# Patient Record
Sex: FEMALE | Race: BLACK OR AFRICAN AMERICAN | Employment: OTHER | URBAN - METROPOLITAN AREA
[De-identification: names, ages, dates, MRNs, and addresses within clinical notes are randomized per-mention and may not be internally consistent; named-entity substitution may affect disease eponyms.]

---

## 2018-02-11 ENCOUNTER — HOSPITAL ENCOUNTER (EMERGENCY)
Age: 38
Discharge: HOME | End: 2018-02-11
Attending: EMERGENCY MEDICINE
Payer: MEDICARE

## 2018-02-11 VITALS
BODY MASS INDEX: 35.85 KG/M2 | RESPIRATION RATE: 20 BRPM | HEIGHT: 64 IN | OXYGEN SATURATION: 98 % | WEIGHT: 210 LBS | TEMPERATURE: 98.7 F | SYSTOLIC BLOOD PRESSURE: 122 MMHG | DIASTOLIC BLOOD PRESSURE: 69 MMHG | HEART RATE: 99 BPM

## 2018-02-11 DIAGNOSIS — J06.9 ACUTE UPPER RESPIRATORY INFECTION: Primary | ICD-10-CM

## 2018-02-11 DIAGNOSIS — I10 ESSENTIAL HYPERTENSION: ICD-10-CM

## 2018-02-11 DIAGNOSIS — N30.00 ACUTE CYSTITIS WITHOUT HEMATURIA: ICD-10-CM

## 2018-02-11 LAB
ALBUMIN SERPL-MCNC: 3.5 G/DL (ref 3.5–5)
ALBUMIN/GLOB SERPL: 1 {RATIO} (ref 1.2–3.5)
ALP SERPL-CCNC: 67 U/L (ref 50–136)
ALT SERPL-CCNC: 26 U/L (ref 12–65)
ANION GAP SERPL CALC-SCNC: 4 MMOL/L (ref 7–16)
AST SERPL-CCNC: 14 U/L (ref 15–37)
BACTERIA URNS QL MICRO: ABNORMAL /HPF
BASOPHILS # BLD: 0 K/UL (ref 0–0.2)
BASOPHILS NFR BLD: 0 % (ref 0–2)
BILIRUB SERPL-MCNC: 0.2 MG/DL (ref 0.2–1.1)
BUN SERPL-MCNC: 14 MG/DL (ref 6–23)
CALCIUM SERPL-MCNC: 8.9 MG/DL (ref 8.3–10.4)
CASTS URNS QL MICRO: ABNORMAL /LPF
CHLORIDE SERPL-SCNC: 106 MMOL/L (ref 98–107)
CO2 SERPL-SCNC: 31 MMOL/L (ref 21–32)
CREAT SERPL-MCNC: 0.69 MG/DL (ref 0.6–1)
DIFFERENTIAL METHOD BLD: ABNORMAL
EOSINOPHIL # BLD: 0.1 K/UL (ref 0–0.8)
EOSINOPHIL NFR BLD: 1 % (ref 0.5–7.8)
EPI CELLS #/AREA URNS HPF: ABNORMAL /HPF
ERYTHROCYTE [DISTWIDTH] IN BLOOD BY AUTOMATED COUNT: 14 % (ref 11.9–14.6)
GLOBULIN SER CALC-MCNC: 3.4 G/DL (ref 2.3–3.5)
GLUCOSE SERPL-MCNC: 98 MG/DL (ref 65–100)
HCG UR QL: NEGATIVE
HCT VFR BLD AUTO: 39.3 % (ref 35.8–46.3)
HGB BLD-MCNC: 13 G/DL (ref 11.7–15.4)
IMM GRANULOCYTES # BLD: 0 K/UL (ref 0–0.5)
IMM GRANULOCYTES NFR BLD AUTO: 0 % (ref 0–5)
LIPASE SERPL-CCNC: 162 U/L (ref 73–393)
LYMPHOCYTES # BLD: 4.3 K/UL (ref 0.5–4.6)
LYMPHOCYTES NFR BLD: 41 % (ref 13–44)
MCH RBC QN AUTO: 28.6 PG (ref 26.1–32.9)
MCHC RBC AUTO-ENTMCNC: 33.1 G/DL (ref 31.4–35)
MCV RBC AUTO: 86.6 FL (ref 79.6–97.8)
MONOCYTES # BLD: 0.5 K/UL (ref 0.1–1.3)
MONOCYTES NFR BLD: 5 % (ref 4–12)
NEUTS SEG # BLD: 5.5 K/UL (ref 1.7–8.2)
NEUTS SEG NFR BLD: 53 % (ref 43–78)
PLATELET # BLD AUTO: 288 K/UL (ref 150–450)
PMV BLD AUTO: 9.7 FL (ref 10.8–14.1)
POTASSIUM SERPL-SCNC: 3.9 MMOL/L (ref 3.5–5.1)
PROT SERPL-MCNC: 6.9 G/DL (ref 6.3–8.2)
RBC # BLD AUTO: 4.54 M/UL (ref 4.05–5.25)
RBC #/AREA URNS HPF: ABNORMAL /HPF
SODIUM SERPL-SCNC: 141 MMOL/L (ref 136–145)
WBC # BLD AUTO: 10.4 K/UL (ref 4.3–11.1)
WBC URNS QL MICRO: >100 /HPF

## 2018-02-11 PROCEDURE — 81003 URINALYSIS AUTO W/O SCOPE: CPT | Performed by: EMERGENCY MEDICINE

## 2018-02-11 PROCEDURE — 81015 MICROSCOPIC EXAM OF URINE: CPT | Performed by: EMERGENCY MEDICINE

## 2018-02-11 PROCEDURE — 81025 URINE PREGNANCY TEST: CPT

## 2018-02-11 PROCEDURE — 99284 EMERGENCY DEPT VISIT MOD MDM: CPT | Performed by: EMERGENCY MEDICINE

## 2018-02-11 PROCEDURE — 85025 COMPLETE CBC W/AUTO DIFF WBC: CPT | Performed by: EMERGENCY MEDICINE

## 2018-02-11 PROCEDURE — 80053 COMPREHEN METABOLIC PANEL: CPT | Performed by: EMERGENCY MEDICINE

## 2018-02-11 PROCEDURE — 83690 ASSAY OF LIPASE: CPT | Performed by: EMERGENCY MEDICINE

## 2018-02-11 RX ORDER — CEPHALEXIN 500 MG/1
500 CAPSULE ORAL 3 TIMES DAILY
Qty: 21 CAP | Refills: 0 | Status: SHIPPED | OUTPATIENT
Start: 2018-02-11 | End: 2018-02-18

## 2018-02-12 NOTE — ED NOTES
I have reviewed discharge instructions with the patient. The patient verbalized understanding. Patient left ED via Discharge Method: ambulatory to Home with family member. Opportunity for questions and clarification provided. Patient given 2 scripts. To continue your aftercare when you leave the hospital, you may receive an automated call from our care team to check in on how you are doing. This is a free service and part of our promise to provide the best care and service to meet your aftercare needs.  If you have questions, or wish to unsubscribe from this service please call [phone]. Thank you for Choosing our [facility] Emergency Department.

## 2018-02-12 NOTE — ED TRIAGE NOTES
Pt states \"my period went off 5 days ago and I am having extreme menstrual cramps. \"  Pt states \"I also think I have bronchitis. \" Pt denies n/v. Pt states she has urinary frequency and states she is incontinent.

## 2018-02-12 NOTE — DISCHARGE INSTRUCTIONS
Upper Respiratory Infection: After Your Visit to the Emergency Room  Your Care Instructions  You were seen in the emergency room for an upper respiratory infection (URI). This is an infection of the nose, sinuses, or throat. Viruses or bacteria can cause URIs. Colds, flu, and sinusitis are examples of URIs. These infections are spread by coughs, sneezes, and close contact with people who have a URI. Your doctor may have given you antibiotics to treat the infection if it was caused by bacteria. But antibiotics will not help a viral infection. You can treat most infections with home care. This may include drinking lots of fluids and taking over-the-counter medicine for your symptoms. Even though you have been released from the emergency room, you still need to watch for any problems. The doctor carefully checked you. But sometimes problems can develop later. If you have new symptoms, or if your symptoms do not get better, return to the emergency room or call your doctor right away. A visit to the emergency room is only one step in your treatment. Even if you feel better, you still need to do what your doctor recommends, such as going to all suggested follow-up appointments and taking medicines exactly as directed. This will help you recover and help prevent future problems. How can you care for yourself at home? · To prevent dehydration, drink plenty of fluids, enough so that your urine is light yellow or clear like water. Choose water and other caffeine-free clear liquids until you feel better. If you have kidney, heart, or liver disease and have to limit fluids, talk with your doctor before you increase the amount of fluids you drink. · Take acetaminophen (Tylenol) or ibuprofen (Advil, Motrin) for fever or pain. Read and follow all instructions on the label. · If your doctor prescribed antibiotics, take them as directed. Do not stop taking them just because you feel better.  You need to take the full course of antibiotics. · Take cough medicine and a decongestant if your doctor suggests it. · Get plenty of rest.  · Use saline (saltwater) nasal washes to help keep your nasal passages open and wash out mucus and bacteria. You can buy saline nose drops at a grocery store or drugstore. Or you can make your own at home by adding 1 teaspoon of salt and 1 teaspoon of baking soda to 2 cups of distilled water. If you make your own, fill a bulb syringe with the solution, insert the tip into your nostril, and squeeze gently. [place]  your nose. · Use a vaporizer or humidifier to add moisture to the air in your bedroom. Follow the instructions for cleaning it. · Do not smoke or allow others to smoke around you. If you need help quitting, talk to your doctor about stop-smoking programs and medicines. These can increase your chances of quitting for good. When should you call for help? Call 911 if:  · You have severe trouble breathing. Return to the emergency room now if:  · You have a fever with stiff neck or a severe headache. · You have signs of needing more fluids. You have sunken eyes, a dry mouth, and pass only a little dark urine. · You cannot keep down fluids or medicine. Call your doctor today if:  · You have a deep cough and a lot of mucus. · You are too tired to eat or drink. · You have a new symptom, such as a sore throat, an earache, or a rash. Where can you learn more? Go to [employer]  Enter S405 in the search box to learn more about \"Upper Respiratory Infection: After Your Visit to the Emergency Room. \"   © 8088-7130 Healthwise, Incorporated. Care instructions adapted under license by [facility] (which disclaims liability or warranty for this information).  This care instruction is for use with your licensed healthcare professional. If you have questions about a medical condition or this instruction, always ask your healthcare professional. [address]  any warranty or liability for your use of this information.   Content Version: 9.3.07416; Last Revised: February 13, 2012

## 2018-02-12 NOTE — ED PROVIDER NOTES
HPI Comments: Patient has a couple seemingly unrelated complaints:    1. She has had  Significant  Sinus congestion and drainage for the past couple months. She states when she lies down flat, she feels like her sinuses start draining back into her throat. She describes it as clear drainage with some sinus pressure. She has not tried any medicine for her symptoms. 2.  Lower abdominal pain with significant abdominal distention for the past week or so. She states that she finished her period 5 days ago and says that she has gained significant weight over the past couple weeks. She denies any urinary symptoms persisted he is incontinent that this is not new. She denies fever or dysuria, denies similar symptoms in the past but she has not taken any medicine for her symptoms. Elements of this note were created using speech recognition software. As such, errors of speech recognition may be present. Patient is a 42 y.o. female presenting with abdominal pain. The history is provided by the patient. Abdominal Pain    Pertinent negatives include no fever, no nausea and no vomiting. Past Medical History:   Diagnosis Date    Dyspnea     with fluid volume overload on hospitilization 5/29/13; responded to diuresis    GERD (gastroesophageal reflux disease)     occ episode when drinks milk    Hypertension 6/11/13    recently obtained insurance, not yet medicated    Kidney stone     Psychiatric disorder     bipolar/ schizo-effective disorder    Snores     suspect sleep apnea, not yet had sleep study       Past Surgical History:   Procedure Laterality Date    HX UROLOGICAL  5/29/13    cystoscopy with stent         Family History:   Problem Relation Age of Onset    Heart Disease Father        Social History     Social History    Marital status:      Spouse name: N/A    Number of children: N/A    Years of education: N/A     Occupational History    Not on file.      Social History Main Topics  Smoking status: Former Smoker     Packs/day: 0.50     Years: 15.00    Smokeless tobacco: Never Used    Alcohol use No    Drug use: No    Sexual activity: Not on file     Other Topics Concern    Not on file     Social History Narrative    No narrative on file         ALLERGIES: Lisinopril    Review of Systems   Constitutional: Negative for chills and fever. Gastrointestinal: Positive for abdominal pain. Negative for nausea and vomiting. All other systems reviewed and are negative. Vitals:    02/11/18 1923   BP: (!) 131/95   Pulse: 100   Resp: 20   Temp: 98.9 °F (37.2 °C)   SpO2: 98%   Weight: 95.3 kg (210 lb)   Height: 5' 4\" (1.626 m)            Physical Exam   Constitutional: She is oriented to person, place, and time. She appears well-developed and well-nourished. HENT:   Head: Normocephalic and atraumatic. Eyes: Conjunctivae are normal. Pupils are equal, round, and reactive to light. Neck: Normal range of motion. Neck supple. Cardiovascular: Normal rate and regular rhythm. Pulmonary/Chest: Effort normal and breath sounds normal.   Abdominal: Soft. Bowel sounds are normal. There is no tenderness. There is no rebound. Musculoskeletal: She exhibits no edema or tenderness. Neurological: She is alert and oriented to person, place, and time. Skin: Skin is warm and dry. Psychiatric: She has a normal mood and affect. Her behavior is normal.   Nursing note and vitals reviewed.        MDM  Number of Diagnoses or Management Options  Acute cystitis without hematuria: new and does not require workup  Acute upper respiratory infection: new and does not require workup  Essential hypertension:   Diagnosis management comments: Differential diagnosis: urinary tract infection, dysmenorrhea, ectopic pregnancy  9:46 PM discussed results with the patient, need for antibiotics and she appears comfortable and in no distress       Amount and/or Complexity of Data Reviewed  Clinical lab tests: ordered and reviewed    Risk of Complications, Morbidity, and/or Mortality  Presenting problems: moderate  Diagnostic procedures: moderate  Management options: moderate    Patient Progress  Patient progress: improved        ED Course       Procedures

## 2018-11-16 ENCOUNTER — HOSPITAL ENCOUNTER (OUTPATIENT)
Dept: GENERAL RADIOLOGY | Age: 38
Discharge: HOME | End: 2018-11-16
Attending: OTOLARYNGOLOGY
Payer: MEDICARE

## 2018-11-16 DIAGNOSIS — G47.33 OBSTRUCTIVE SLEEP APNEA: ICD-10-CM

## 2018-11-16 DIAGNOSIS — K21.00 GERD WITH ESOPHAGITIS: ICD-10-CM

## 2018-11-16 PROCEDURE — 74011000250 HC RX REV CODE- 250: Performed by: OTOLARYNGOLOGY

## 2018-11-16 PROCEDURE — 74220 X-RAY XM ESOPHAGUS 1CNTRST: CPT

## 2018-11-16 PROCEDURE — 74011000255 HC RX REV CODE- 255: Performed by: OTOLARYNGOLOGY

## 2018-11-16 RX ADMIN — BARIUM SULFATE 135 ML: 980 POWDER, FOR SUSPENSION ORAL at 10:54

## 2018-11-16 RX ADMIN — BARIUM SULFATE 355 ML: 0.6 SUSPENSION ORAL at 10:56

## 2018-11-16 RX ADMIN — BARIUM SULFATE 700 MG: 700 TABLET ORAL at 10:55

## 2018-11-16 RX ADMIN — ANTACID/ANTIFLATULENT 4 G: 380; 550; 10; 10 GRANULE, EFFERVESCENT ORAL at 10:55

## 2019-03-10 ENCOUNTER — HOSPITAL ENCOUNTER (EMERGENCY)
Age: 39
Discharge: HOME | End: 2019-03-10
Attending: EMERGENCY MEDICINE
Payer: MEDICARE

## 2019-03-10 ENCOUNTER — APPOINTMENT (OUTPATIENT)
Dept: GENERAL RADIOLOGY | Age: 39
End: 2019-03-10
Attending: EMERGENCY MEDICINE
Payer: MEDICARE

## 2019-03-10 VITALS
SYSTOLIC BLOOD PRESSURE: 145 MMHG | WEIGHT: 210 LBS | TEMPERATURE: 98 F | HEIGHT: 65 IN | BODY MASS INDEX: 34.99 KG/M2 | DIASTOLIC BLOOD PRESSURE: 78 MMHG | RESPIRATION RATE: 16 BRPM | HEART RATE: 80 BPM | OXYGEN SATURATION: 98 %

## 2019-03-10 VITALS
DIASTOLIC BLOOD PRESSURE: 88 MMHG | OXYGEN SATURATION: 100 % | TEMPERATURE: 98 F | HEART RATE: 104 BPM | SYSTOLIC BLOOD PRESSURE: 137 MMHG

## 2019-03-10 DIAGNOSIS — R07.89 ATYPICAL CHEST PAIN: Primary | ICD-10-CM

## 2019-03-10 DIAGNOSIS — R09.81 SINUS CONGESTION: ICD-10-CM

## 2019-03-10 DIAGNOSIS — F19.10 POLYSUBSTANCE ABUSE (HCC): ICD-10-CM

## 2019-03-10 LAB
ALBUMIN SERPL-MCNC: 3.6 G/DL (ref 3.5–5)
ALBUMIN/GLOB SERPL: 0.9 {RATIO} (ref 1.2–3.5)
ALP SERPL-CCNC: 76 U/L (ref 50–136)
ALT SERPL-CCNC: 19 U/L (ref 12–65)
AMPHET UR QL SCN: POSITIVE
ANION GAP SERPL CALC-SCNC: 9 MMOL/L (ref 7–16)
AST SERPL-CCNC: 12 U/L (ref 15–37)
ATRIAL RATE: 159 BPM
ATRIAL RATE: 90 BPM
ATRIAL RATE: 97 BPM
BACTERIA URNS QL MICRO: ABNORMAL /HPF
BARBITURATES UR QL SCN: NEGATIVE
BASOPHILS # BLD: 0.1 K/UL (ref 0–0.2)
BASOPHILS NFR BLD: 1 % (ref 0–2)
BENZODIAZ UR QL: NEGATIVE
BILIRUB SERPL-MCNC: 0.2 MG/DL (ref 0.2–1.1)
BUN SERPL-MCNC: 10 MG/DL (ref 6–23)
CALCIUM SERPL-MCNC: 9 MG/DL (ref 8.3–10.4)
CALCULATED P AXIS, ECG09: 64 DEGREES
CALCULATED P AXIS, ECG09: 65 DEGREES
CALCULATED P AXIS, ECG09: 72 DEGREES
CALCULATED R AXIS, ECG10: -29 DEGREES
CALCULATED R AXIS, ECG10: -31 DEGREES
CALCULATED R AXIS, ECG10: -43 DEGREES
CALCULATED T AXIS, ECG11: 60 DEGREES
CALCULATED T AXIS, ECG11: 62 DEGREES
CALCULATED T AXIS, ECG11: 63 DEGREES
CANNABINOIDS UR QL SCN: NEGATIVE
CASTS URNS QL MICRO: 0 /LPF
CHLORIDE SERPL-SCNC: 103 MMOL/L (ref 98–107)
CO2 SERPL-SCNC: 26 MMOL/L (ref 21–32)
COCAINE UR QL SCN: NEGATIVE
CREAT SERPL-MCNC: 0.98 MG/DL (ref 0.6–1)
CRYSTALS URNS QL MICRO: 0 /LPF
D DIMER PPP FEU-MCNC: 0.52 UG/ML(FEU)
DIAGNOSIS, 93000: NORMAL
DIFFERENTIAL METHOD BLD: ABNORMAL
EOSINOPHIL # BLD: 0.1 K/UL (ref 0–0.8)
EOSINOPHIL NFR BLD: 1 % (ref 0.5–7.8)
EPI CELLS #/AREA URNS HPF: ABNORMAL /HPF
ERYTHROCYTE [DISTWIDTH] IN BLOOD BY AUTOMATED COUNT: 15.4 % (ref 11.9–14.6)
GLOBULIN SER CALC-MCNC: 3.9 G/DL (ref 2.3–3.5)
GLUCOSE SERPL-MCNC: 121 MG/DL (ref 65–100)
HCT VFR BLD AUTO: 45.5 % (ref 35.8–46.3)
HGB BLD-MCNC: 14.5 G/DL (ref 11.7–15.4)
IMM GRANULOCYTES # BLD AUTO: 0 K/UL (ref 0–0.5)
IMM GRANULOCYTES NFR BLD AUTO: 0 % (ref 0–5)
LYMPHOCYTES # BLD: 3.7 K/UL (ref 0.5–4.6)
LYMPHOCYTES NFR BLD: 39 % (ref 13–44)
MCH RBC QN AUTO: 26.8 PG (ref 26.1–32.9)
MCHC RBC AUTO-ENTMCNC: 31.9 G/DL (ref 31.4–35)
MCV RBC AUTO: 83.9 FL (ref 79.6–97.8)
METHADONE UR QL: NEGATIVE
MONOCYTES # BLD: 0.4 K/UL (ref 0.1–1.3)
MONOCYTES NFR BLD: 5 % (ref 4–12)
MUCOUS THREADS URNS QL MICRO: 0 /LPF
NEUTS SEG # BLD: 5.1 K/UL (ref 1.7–8.2)
NEUTS SEG NFR BLD: 55 % (ref 43–78)
NRBC # BLD: 0 K/UL (ref 0–0.2)
OPIATES UR QL: NEGATIVE
P-R INTERVAL, ECG05: 158 MS
P-R INTERVAL, ECG05: 162 MS
PCP UR QL: NEGATIVE
PLATELET # BLD AUTO: 297 K/UL (ref 150–450)
PMV BLD AUTO: 9.6 FL (ref 9.4–12.3)
POTASSIUM SERPL-SCNC: 3.8 MMOL/L (ref 3.5–5.1)
PROT SERPL-MCNC: 7.5 G/DL (ref 6.3–8.2)
Q-T INTERVAL, ECG07: 332 MS
Q-T INTERVAL, ECG07: 344 MS
Q-T INTERVAL, ECG07: 364 MS
QRS DURATION, ECG06: 82 MS
QRS DURATION, ECG06: 82 MS
QRS DURATION, ECG06: 84 MS
QTC CALCULATION (BEZET), ECG08: 417 MS
QTC CALCULATION (BEZET), ECG08: 420 MS
QTC CALCULATION (BEZET), ECG08: 481 MS
RBC # BLD AUTO: 5.42 M/UL (ref 4.05–5.2)
RBC #/AREA URNS HPF: ABNORMAL /HPF
SODIUM SERPL-SCNC: 138 MMOL/L (ref 136–145)
TROPONIN I BLD-MCNC: 0 NG/ML (ref 0.02–0.05)
TROPONIN I BLD-MCNC: 0.01 NG/ML (ref 0.02–0.05)
TROPONIN I BLD-MCNC: 0.02 NG/ML (ref 0.02–0.05)
TROPONIN I SERPL-MCNC: <0.02 NG/ML (ref 0.02–0.05)
VENTRICULAR RATE, ECG03: 105 BPM
VENTRICULAR RATE, ECG03: 90 BPM
VENTRICULAR RATE, ECG03: 95 BPM
WBC # BLD AUTO: 9.4 K/UL (ref 4.3–11.1)
WBC URNS QL MICRO: ABNORMAL /HPF
YEAST URNS QL MICRO: ABNORMAL

## 2019-03-10 PROCEDURE — 85025 COMPLETE CBC W/AUTO DIFF WBC: CPT

## 2019-03-10 PROCEDURE — 84484 ASSAY OF TROPONIN QUANT: CPT

## 2019-03-10 PROCEDURE — 85379 FIBRIN DEGRADATION QUANT: CPT

## 2019-03-10 PROCEDURE — 81015 MICROSCOPIC EXAM OF URINE: CPT

## 2019-03-10 PROCEDURE — 99284 EMERGENCY DEPT VISIT MOD MDM: CPT | Performed by: EMERGENCY MEDICINE

## 2019-03-10 PROCEDURE — 93005 ELECTROCARDIOGRAM TRACING: CPT | Performed by: EMERGENCY MEDICINE

## 2019-03-10 PROCEDURE — 81003 URINALYSIS AUTO W/O SCOPE: CPT | Performed by: EMERGENCY MEDICINE

## 2019-03-10 PROCEDURE — 71045 X-RAY EXAM CHEST 1 VIEW: CPT

## 2019-03-10 PROCEDURE — 80053 COMPREHEN METABOLIC PANEL: CPT

## 2019-03-10 PROCEDURE — 74011250637 HC RX REV CODE- 250/637: Performed by: EMERGENCY MEDICINE

## 2019-03-10 PROCEDURE — 80307 DRUG TEST PRSMV CHEM ANLYZR: CPT

## 2019-03-10 RX ORDER — OXYMETAZOLINE HCL 0.05 %
2 SPRAY, NON-AEROSOL (ML) NASAL
Status: COMPLETED | OUTPATIENT
Start: 2019-03-10 | End: 2019-03-10

## 2019-03-10 RX ORDER — LORAZEPAM 1 MG/1
1 TABLET ORAL
Status: COMPLETED | OUTPATIENT
Start: 2019-03-10 | End: 2019-03-10

## 2019-03-10 RX ORDER — LORAZEPAM 1 MG/1
1 TABLET ORAL
Status: DISCONTINUED | OUTPATIENT
Start: 2019-03-10 | End: 2019-03-10

## 2019-03-10 RX ORDER — BENZONATATE 200 MG/1
200 CAPSULE ORAL
Qty: 21 CAP | Refills: 0 | Status: SHIPPED | OUTPATIENT
Start: 2019-03-10 | End: 2019-03-17

## 2019-03-10 RX ADMIN — LORAZEPAM 1 MG: 1 TABLET ORAL at 12:23

## 2019-03-10 RX ADMIN — OXYMETAZOLINE HCL 2 SPRAY: 0.05 SPRAY NASAL at 08:31

## 2019-03-10 RX ADMIN — LORAZEPAM 1 MG: 1 TABLET ORAL at 10:23

## 2019-03-10 NOTE — DISCHARGE INSTRUCTIONS
Patient Education      Use afrin nasal spray, one spray to each nostril, every 12 hours, TWICE a day, for THREE days only, (then set it aside for 1 week)  1,200mg mucinex DM every 12 hours for a week. Take the sinus decongestants as prescribed  don't smoke meth or other illegal substances  Drink plenty of fluids    Musculoskeletal Chest Pain: Care Instructions  Your Care Instructions    Chest pain is not always a sign that something is wrong with your heart or that you have another serious problem. The doctor thinks your chest pain is caused by strained muscles or ligaments, inflamed chest cartilage, or another problem in your chest, rather than by your heart. You may need more tests to find the cause of your chest pain. Follow-up care is a key part of your treatment and safety. Be sure to make and go to all appointments, and call your doctor if you are having problems. It's also a good idea to know your test results and keep a list of the medicines you take. How can you care for yourself at home? · Take pain medicines exactly as directed. ? If the doctor gave you a prescription medicine for pain, take it as prescribed. ? If you are not taking a prescription pain medicine, ask your doctor if you can take an over-the-counter medicine. · Rest and protect the sore area. · Stop, change, or take a break from any activity that may be causing your pain or soreness. · Put ice or a cold pack on the sore area for 10 to 20 minutes at a time. Try to do this every 1 to 2 hours for the next 3 days (when you are awake) or until the swelling goes down. Put a thin cloth between the ice and your skin. · After 2 or 3 days, apply a heating pad set on low or a warm cloth to the area that hurts. Some doctors suggest that you go back and forth between hot and cold. · Do not wrap or tape your ribs for support. This may cause you to take smaller breaths, which could increase your risk of lung problems.   · Mentholated creams such as Emily Sutton or [facility] may soothe sore muscles. Follow the instructions on the package. · Follow your doctor's instructions for exercising. · Gentle stretching and massage may help you get better faster. Stretch slowly to the point just before pain begins, and hold the stretch for at least 15 to 30 seconds. Do this 3 or 4 times a day. Stretch just after you have applied heat. · As your pain gets better, slowly return to your normal activities. Any increased pain may be a sign that you need to rest a while longer. When should you call for help? Call 911 anytime you think you may need emergency care. For example, call if:    · You have chest pain or pressure. This may occur with:  ? Sweating. ? Shortness of breath. ? Nausea or vomiting. ? Pain that spreads from the chest to the neck, jaw, or one or both shoulders or arms. ? Dizziness or lightheadedness. ? A fast or uneven pulse. After calling 911, chew 1 adult-strength aspirin. Wait for an ambulance. Do not try to drive yourself.     · You have sudden chest pain and shortness of breath, or you cough up blood.    Call your doctor now or seek immediate medical care if:    · You have any trouble breathing.     · Your chest pain gets worse.     · Your chest pain occurs consistently with exercise and is relieved by rest.    Watch closely for changes in your health, and be sure to contact your doctor if:    · Your chest pain does not get better after 1 week. Where can you learn more? Go to http://pavel-dimitrios.info/. Enter V293 in the search box to learn more about \"Musculoskeletal Chest Pain: Care Instructions. \"  Current as of: September 23, 2018  Content Version: 11.9  © 3386-2069 [employer]. Care instructions adapted under license by [employer] (which disclaims liability or warranty for this information).  If you have questions about a medical condition or this instruction, always ask your healthcare professional. Healthwise, [facility] disclaims any warranty or liability for your use of this information. Patient Education        Saline Nasal Washes: Care Instructions  Your Care Instructions  Saline nasal washes help keep the nasal passages open by washing out thick or dried mucus. This simple remedy can help relieve symptoms of allergies, sinusitis, and colds. It also can make the nose feel more comfortable by keeping the mucous membranes moist. You may notice a little burning sensation in your nose the first few times you use the solution, but this usually gets better in a few days. Follow-up care is a key part of your treatment and safety. Be sure to make and go to all appointments, and call your doctor if you are having problems. It's also a good idea to know your test results and keep a list of the medicines you take. How can you care for yourself at home? · You can buy premixed saline solution in a squeeze bottle or other sinus rinse products at a drugstore. Read and follow the instructions on the label. · You also can make your own saline solution by adding 1 teaspoon of salt and 1 teaspoon of baking soda to 2 cups of distilled water. · If you use a homemade solution, pour a small amount into a clean bowl. Using a rubber bulb syringe, squeeze the syringe and place the tip in the salt water. Pull a small amount of the salt water into the syringe by relaxing your hand. · Sit down with your head tilted slightly back. Do not lie down. Put the tip of the bulb syringe or the squeeze bottle a little way into one of your nostrils. Gently drip or squirt a few drops into the nostril. Repeat with the other nostril. Some sneezing and gagging are normal at first.  · Gently blow your nose. · Wipe the syringe or bottle tip clean after each use. · Repeat this 2 or 3 times a day. · Use nasal washes gently if you have nosebleeds often. When should you call for help?   Watch closely for changes in your health, and be sure to contact your doctor if:    · You often get nosebleeds.     · You have problems doing the nasal washes. Where can you learn more? Go to http://pavel-dimitrios.info/. Enter [id number] [phone] in the search box to learn more about \"Saline Nasal Washes: Care Instructions. \"  Current as of: March 27, 2018  Content Version: 11.9  © 9918-8967 [employer]. Care instructions adapted under license by [employer] (which disclaims liability or warranty for this information). If you have questions about a medical condition or this instruction, always ask your healthcare professional. [place] [address] any warranty or liability for your use of this information. Patient Education        Learning About Drug Misuse  What is drug misuse? Drug misuse means using drugs in a way that harms you or causes you to harm others. Your doctor may call it substance use disorder or drug abuse. It's possible to misuse almost any type of drug, including illegal drugs, prescription drugs, and over-the-counter drugs. An overdose happens when a person takes more than the normal or recommended amount of a drug. An overdose can result in harmful symptoms or death. Could you have a problem? If there's a chance you may be misusing drugs, it's important to find out. So ask yourself a few questions. · Do you spend a lot of time thinking about your medicine or other drug--getting it and using it? Has that taken the place of other things you used to enjoy? · Have you had problems with your family or friends, or at work, because of your use? · Do you use drugs even when you've told yourself you won't? Do you think you might have a problem? If you do, then you've just taken an important first step. Many people have overcome this problem. And most of them started by reaching out to others, like caring friends or family, their doctor, or a support group. How is drug misuse treated?   If you think you may have a problem with drugs, talk to your doctor. You and your doctor can decide whether you have a problem and what type of treatment might help you. You may need to stay in a hospital at first, so that you can be treated for withdrawal symptoms. One of the goals of treatment is helping you get used to life without the drug. Counseling can help you prepare for people or situations that might tempt you to start using again. You can practice these skills through one-on-one counseling, family therapy, or group therapy. Therapy may be part of inpatient treatment, where you stay in a treatment center. Or it may be part of outpatient treatment, where you can fit your therapy around your job or other responsibilities. Another goal of treatment is getting ongoing support for your drug-free life. Many people find support by going to meetings like Narcotics Anonymous or SMART Recovery. This type of support can help you feel less alone and more motivated to stay drug-free. You might talk to your doctor or do an online search for local treatment programs. Or you might tell a friend or loved one that you need help. Follow-up care is a key part of your treatment and safety. Be sure to make and go to all appointments, and call your doctor if you are having problems. It's also a good idea to know your test results and keep a list of the medicines you take. Where can you learn more? Go to http://pavel-dimitrios.info/. Enter [phone] in the search box to learn more about \"Learning About Drug Misuse. \"  Current as of: May 7, 2018  Content Version: 11.9  © 4186-7928 [employer]. Care instructions adapted under license by [employer] (which disclaims liability or warranty for this information).  If you have questions about a medical condition or this instruction, always ask your healthcare professional. [place] [address] any warranty or liability for your use of this information.

## 2019-03-10 NOTE — ED PROVIDER NOTES
she still has nasal congestion and some cough is mainly nonproductive, some soreness to her lower central chest.  She is unaware of any fever that she has. Does not have any defined or known cardiac legacy/history. Was in ER a few hours ago. Drug abuse issues discussed      The history is provided by the patient. Shortness of Breath   The current episode started more than 2 days ago. Associated symptoms include rhinorrhea and cough. Pertinent negatives include no fever, no sputum production, no hemoptysis, no leg pain and no leg swelling. She has tried nothing for the symptoms. Associated medical issues do not include COPD, PE or CAD.         Past Medical History:   Diagnosis Date    Dyspnea     with fluid volume overload on hospitilization 5/29/13; responded to diuresis    GERD (gastroesophageal reflux disease)     occ episode when drinks milk    Hypertension 6/11/13    recently obtained insurance, not yet medicated    Kidney stone     Psychiatric disorder     bipolar/ schizo-effective disorder    Snores     suspect sleep apnea, not yet had sleep study       Past Surgical History:   Procedure Laterality Date    HX UROLOGICAL  5/29/13    cystoscopy with stent         Family History:   Problem Relation Age of Onset    Heart Disease Father        Social History     Socioeconomic History    Marital status:      Spouse name: Not on file    Number of children: Not on file    Years of education: Not on file    Highest education level: Not on file   Social Needs    Financial resource strain: Not on file    Food insecurity - worry: Not on file    Food insecurity - inability: Not on file   [place] [employer] needs - medical: Not on file   [employer] needs - non-medical: Not on file   Occupational History    Not on file   Tobacco Use    Smoking status: Former Smoker     Packs/day: 0.50     Years: 15.00     Pack years: 7.50    Smokeless tobacco: Never Used   Substance and Sexual Activity    Alcohol use: No    Drug use: Yes     Types: Marijuana, Methamphetamines    Sexual activity: Not on file   Other Topics Concern    Not on file   Social History Narrative    Not on file         ALLERGIES: Lisinopril    Review of Systems   Constitutional: Negative for fever. HENT: Positive for rhinorrhea. Respiratory: Positive for cough and shortness of breath. Negative for hemoptysis and sputum production. Cardiovascular: Negative for leg swelling. Gastrointestinal: Negative. Genitourinary: Negative. Musculoskeletal: Negative. Hematological: Negative. All other systems reviewed and are negative. Vitals:    03/10/19 0724   BP: 138/84   Pulse: (!) 104   Temp: 98 °F (36.7 °C)   SpO2: 98%            Physical Exam   Constitutional: She appears well-developed and well-nourished. No distress. HENT:   Head: Atraumatic. Eyes: No scleral icterus. Neck: Neck supple. Cardiovascular: Normal rate. Pulmonary/Chest: Effort normal. No respiratory distress. Abdominal: Soft. There is no tenderness. Neurological: She is alert. Skin: Skin is warm and dry. Psychiatric: Thought content normal. She is not agitated. Nursing note and vitals reviewed.        MDM  Number of Diagnoses or Management Options  Atypical chest pain:      Amount and/or Complexity of Data Reviewed  Clinical lab tests: reviewed and ordered  Decide to obtain previous medical records or to obtain history from someone other than the patient: yes    Risk of Complications, Morbidity, and/or Mortality  Presenting problems: moderate  Diagnostic procedures: low  Management options: moderate           Procedures

## 2019-03-10 NOTE — ED NOTES
This RN, charge RN, pulled into room by patient family member due to primary RN being busy with EMS.  and patient concerned due to patient stating pain with IV and that patient has not received ordered Ativan. This RN explained to patient pharmacy had to restock ativan into pyxis before we could supply ordered dose. Dose pulled at this time and given to patient. IV removed.  Blair Humphrey MD notified of IV removal. Pt given permission to have breakfast by MD. Family to go to cafeteria to get patient breakfast.

## 2019-03-10 NOTE — ED NOTES
I have reviewed discharge instructions with the patient. The patient verbalized understanding. Patient left ED via Discharge Method: ambulatory to Home with . Opportunity for questions and clarification provided. Patient given 2 scripts. To continue your aftercare when you leave the hospital, you may receive an automated call from our care team to check in on how you are doing. This is a free service and part of our promise to provide the best care and service to meet your aftercare needs.  If you have questions, or wish to unsubscribe from this service please call [phone]. Thank you for Choosing our [facility] Emergency Department.

## 2019-03-10 NOTE — ED PROVIDER NOTES
Patient presents with chest pain and sinus congestion and insomnia onset 3 days ago. just prior to onset, patient had been smoking some methamphetamine with a friend following an argument with somebody else  Pain is constant all day long feels like a tightness across her chest.  She has a productive cough and feels postnasal drip coming down her throat which makes the cough worse             Past Medical History:   Diagnosis Date    Dyspnea     with fluid volume overload on hospitilization 5/29/13; responded to diuresis    GERD (gastroesophageal reflux disease)     occ episode when drinks milk    Hypertension 6/11/13    recently obtained insurance, not yet medicated    Kidney stone     Psychiatric disorder     bipolar/ schizo-effective disorder    Snores     suspect sleep apnea, not yet had sleep study       Past Surgical History:   Procedure Laterality Date    HX UROLOGICAL  5/29/13    cystoscopy with stent         Family History:   Problem Relation Age of Onset    Heart Disease Father        Social History     Socioeconomic History    Marital status:      Spouse name: Not on file    Number of children: Not on file    Years of education: Not on file    Highest education level: Not on file   Social Needs    Financial resource strain: Not on file    Food insecurity - worry: Not on file    Food insecurity - inability: Not on file   [employer] needs - medical: Not on file   [employer] needs - non-medical: Not on file   Occupational History    Not on file   Tobacco Use    Smoking status: Former Smoker     Packs/day: 0.50     Years: 15.00     Pack years: 7.50    Smokeless tobacco: Never Used   Substance and Sexual Activity    Alcohol use: No    Drug use: No    Sexual activity: Not on file   Other Topics Concern    Not on file   Social History Narrative    Not on file         ALLERGIES: Lisinopril    Review of Systems   Constitutional: Negative for chills and fever.    HENT: Positive for congestion, postnasal drip and sore throat. Negative for rhinorrhea. Eyes: Negative for discharge and redness. Respiratory: Positive for cough and shortness of breath. Cardiovascular: Positive for chest pain. Negative for palpitations. Gastrointestinal: Negative for abdominal pain, nausea and vomiting. Musculoskeletal: Negative for arthralgias and back pain. Skin: Negative for rash. Neurological: Negative for dizziness and headaches. Psychiatric/Behavioral: Positive for sleep disturbance. The patient is nervous/anxious. All other systems reviewed and are negative. Vitals:    03/10/19 0115 03/10/19 0126   BP: 155/54    Pulse: 98    Resp: 18    Temp: 98 °F (36.7 °C)    SpO2: 96% 97%   Weight: 95.3 kg (210 lb)    Height: 5' 5\" (1.651 m)             Physical Exam   Constitutional: She is oriented to person, place, and time. She appears well-developed and well-nourished. No distress. HENT:   Head: Normocephalic and atraumatic. Nose: Nose normal. No mucosal edema or rhinorrhea. No epistaxis. Right sinus exhibits no maxillary sinus tenderness and no frontal sinus tenderness. Left sinus exhibits no maxillary sinus tenderness and no frontal sinus tenderness. Eyes: Conjunctivae are normal. Pupils are equal, round, and reactive to light. Right eye exhibits no discharge. Left eye exhibits no discharge. No scleral icterus. Neck: Normal range of motion. Neck supple. Cardiovascular: Normal rate, regular rhythm and normal heart sounds. Exam reveals no gallop. No murmur heard. Pulmonary/Chest: Effort normal and breath sounds normal. No respiratory distress. She has no wheezes. She has no rales. Abdominal: Soft. There is no tenderness. There is no guarding. Musculoskeletal: Normal range of motion. She exhibits no edema. Neurological: She is alert and oriented to person, place, and time. She exhibits normal muscle tone. cni 2-12 grossly   Skin: Skin is warm and dry.  She is not diaphoretic. Psychiatric: She has a normal mood and affect. Her behavior is normal.   Nursing note and vitals reviewed. MDM  Number of Diagnoses or Management Options  Atypical chest pain:   Polysubstance abuse [facility]):   Sinus congestion:   Diagnosis management comments: Medical decision making note:  Atypical chest pain with postnasal drip from sinuses  First EKG and troponin are okay  Patient wanted to leave AMA so she go outside to smoke, second troponin drawn maybe 30 minutes early  treat for sinus congestion, patient discharged  Quit smoking meth  This concludes the \"medical decision making note\" part of this emergency department visit note.            Procedures

## 2019-03-10 NOTE — ED NOTES
Pt states she wants to leave, pt was instructed that her troponin recheck was at 0500 and told that this test was specifically rechecked at 3 hours to catch any elevation based on evidence-based practice. Pt gave verbal feedback that she understood that she was running the risk of missing possible elevation in her troponin level by leaving before her recheck. MD made aware of this and verbal order to pull another troponin at this time.

## 2019-03-10 NOTE — ED NOTES
I have reviewed discharge instructions with the patient. The patient verbalized understanding. Patient left ED via Discharge Method: ambulatory to Home with family. Opportunity for questions and clarification provided. Patient given 0 scripts. To continue your aftercare when you leave the hospital, you may receive an automated call from our care team to check in on how you are doing. This is a free service and part of our promise to provide the best care and service to meet your aftercare needs.  If you have questions, or wish to unsubscribe from this service please call [phone]. Thank you for Choosing our [facility] Emergency Department.

## 2019-03-10 NOTE — DISCHARGE INSTRUCTIONS
See your doctor tomorrow morning for consideration of medication for stress/anxiety. Recheck with worsening chest discomfort.   Nasal congestion: May use Afrin nasal spray twice daily for 2-3 days, then stop this medication and only use saline nasal

## 2019-03-10 NOTE — ED TRIAGE NOTES
Pt ambulatory to room 5 without complications. Pt returns after leaving AMA stating she had an anxiety attach and had to go. Pt is asking about her results from her visit last night and educated we will need to start with a new slate. Pt reports using meth and marijuana about 3-4 days ago and has been having palpitations since then with SOB and her anxiety has been increasing. Pt reports cp to middle of chest 7/10 and describes as a pulling pain. Pt states her mother is dying and she is under more stress right now.

## 2019-03-10 NOTE — ED NOTES
In room to give pt medication, family member states \"this is ridiculous, we are about to just walk out, I am tired of waiting. Explained to family member that pt states she was feeling very anxious and Dr. Anshul Leung has ordered ativan to help calm her nerves. Pt family states   \"Then give her the damn pill so we can leave. \"

## 2019-03-10 NOTE — ED TRIAGE NOTES
Pt walked into triage. Pt states she cant breath for a couple days. Tonight it became an emergency because she couldn't sleep. Pt states fluids is dripping down back into her throat. Pt c/o chest tightness. Pt stated this all started after she smoked meth either Tuesday or Wednesday. Pt states \"I feel like im dying, minute by minute. \"  Pt states in January her \"throat was stretched. \"

## 2019-05-13 PROBLEM — G62.9 NEUROPATHY: Status: ACTIVE | Noted: 2019-05-13

## 2019-05-13 PROBLEM — F25.0 SCHIZOAFFECTIVE DISORDER, BIPOLAR TYPE (HCC): Status: ACTIVE | Noted: 2019-05-13

## 2019-05-13 PROBLEM — E11.9 TYPE II DIABETES MELLITUS (HCC): Status: ACTIVE | Noted: 2019-05-13

## 2019-05-13 PROBLEM — E66.01 SEVERE OBESITY (HCC): Status: ACTIVE | Noted: 2019-05-13

## 2019-05-13 PROBLEM — J30.9 ALLERGIC RHINITIS: Status: ACTIVE | Noted: 2019-05-13

## 2019-05-13 PROBLEM — F39 MOOD DISORDER (HCC): Status: ACTIVE | Noted: 2019-05-13

## 2019-05-13 PROBLEM — E78.5 DYSLIPIDEMIA: Status: ACTIVE | Noted: 2019-05-13

## 2019-06-04 ENCOUNTER — HOSPITAL ENCOUNTER (EMERGENCY)
Age: 39
Discharge: HOME | End: 2019-06-04
Payer: MEDICARE

## 2019-06-04 ENCOUNTER — APPOINTMENT (OUTPATIENT)
Dept: GENERAL RADIOLOGY | Age: 39
End: 2019-06-04
Payer: MEDICARE

## 2019-06-04 VITALS
HEART RATE: 96 BPM | RESPIRATION RATE: 20 BRPM | OXYGEN SATURATION: 100 % | DIASTOLIC BLOOD PRESSURE: 94 MMHG | TEMPERATURE: 98.3 F | HEIGHT: 65 IN | WEIGHT: 225 LBS | SYSTOLIC BLOOD PRESSURE: 173 MMHG | BODY MASS INDEX: 37.49 KG/M2

## 2019-06-04 DIAGNOSIS — R04.2 COUGH WITH HEMOPTYSIS: Primary | ICD-10-CM

## 2019-06-04 PROCEDURE — 81003 URINALYSIS AUTO W/O SCOPE: CPT

## 2019-06-04 PROCEDURE — 71046 X-RAY EXAM CHEST 2 VIEWS: CPT

## 2019-06-04 PROCEDURE — 99282 EMERGENCY DEPT VISIT SF MDM: CPT

## 2019-06-04 RX ORDER — PREDNISONE 10 MG/1
TABLET ORAL
Qty: 21 TAB | Refills: 0 | Status: SHIPPED | OUTPATIENT
Start: 2019-06-04 | End: 2019-06-20 | Stop reason: ALTCHOICE

## 2019-06-04 RX ORDER — BENZONATATE 100 MG/1
100 CAPSULE ORAL
Qty: 30 CAP | Refills: 0 | Status: SHIPPED | OUTPATIENT
Start: 2019-06-04 | End: 2019-06-11

## 2019-06-04 RX ORDER — LEVOFLOXACIN 750 MG/1
750 TABLET ORAL DAILY
Qty: 7 TAB | Refills: 0 | Status: SHIPPED | OUTPATIENT
Start: 2019-06-04 | End: 2019-06-20 | Stop reason: ALTCHOICE

## 2019-06-04 NOTE — ED PROVIDER NOTES
40-year-old female complaining of cough and hemoptysis. Patient states she laid down last night after eating some lemon cake and because of her GERD should get up and throw up. after that she said the cough. Cough   This is a new problem. The current episode started 3 to 5 hours ago. The problem occurs constantly. The cough is productive of sputum and productive of blood-tinged sputum. There has been no fever.         Past Medical History:   Diagnosis Date    Dyspnea     with fluid volume overload on hospitilization 5/29/13; responded to diuresis    GERD (gastroesophageal reflux disease)     occ episode when drinks milk    Hypertension 6/11/13    recently obtained insurance, not yet medicated    Kidney stone     Psychiatric disorder     bipolar/ schizo-effective disorder    Snores     suspect sleep apnea, not yet had sleep study       Past Surgical History:   Procedure Laterality Date    HX UROLOGICAL  5/29/13    cystoscopy with stent         Family History:   Problem Relation Age of Onset    Heart Disease Father     Thyroid Disease Sister     Anemia Sister        Social History     Socioeconomic History    Marital status:      Spouse name: Not on file    Number of children: Not on file    Years of education: Not on file    Highest education level: Not on file   Occupational History    Not on file   Social Needs    Financial resource strain: Not on file    Food insecurity:     Worry: Not on file     Inability: Not on file    Transportation needs:     Medical: Not on file     Non-medical: Not on file   Tobacco Use    Smoking status: Current Every Day Smoker     Packs/day: 0.50     Years: 15.00     Pack years: 7.50    Smokeless tobacco: Never Used   Substance and Sexual Activity    Alcohol use: No    Drug use: Not Currently     Types: Marijuana, Methamphetamines, Cocaine     Comment: History of not current use     Sexual activity: Yes     Partners: Male     Birth control/protection: None   Lifestyle    Physical activity:     Days per week: Not on file     Minutes per session: Not on file    Stress: Not on file   Relationships    Social connections:     Talks on phone: Not on file     Gets together: Not on file     Attends Mosque service: Not on file     Active member of club or organization: Not on file     Attends meetings of clubs or organizations: Not on file     Relationship status: Not on file    Intimate partner violence:     Fear of current or ex partner: Not on file     Emotionally abused: Not on file     Physically abused: Not on file     Forced sexual activity: Not on file   Other Topics Concern    Not on file   Social History Narrative    Not on file         ALLERGIES: Lisinopril    Review of Systems   Constitutional: Negative. Negative for activity change. HENT: Negative. Eyes: Negative. Respiratory: Positive for cough. Cardiovascular: Negative. Gastrointestinal: Negative. Genitourinary: Negative. Musculoskeletal: Negative. Skin: Negative. Neurological: Negative. Psychiatric/Behavioral: Negative. All other systems reviewed and are negative. Vitals:    06/04/19 0504   BP: (!) 173/94   Pulse: 96   Resp: 20   Temp: 98.3 °F (36.8 °C)   SpO2: 100%   Weight: 102.1 kg (225 lb)   Height: 5' 5\" (1.651 m)            Physical Exam   Constitutional: She is oriented to person, place, and time. She appears well-developed and well-nourished. No distress. HENT:   Head: Normocephalic and atraumatic. Right Ear: External ear normal.   Left Ear: External ear normal.   Nose: Nose normal.   Mouth/Throat: Oropharynx is clear and moist. No oropharyngeal exudate. Eyes: Pupils are equal, round, and reactive to light. Conjunctivae and EOM are normal. Right eye exhibits no discharge. Left eye exhibits no discharge. No scleral icterus. Neck: Normal range of motion. Neck supple. No JVD present. No tracheal deviation present.    Cardiovascular: Normal rate, regular rhythm and intact distal pulses. Pulmonary/Chest: Effort normal and breath sounds normal. No stridor. No respiratory distress. She has no wheezes. She exhibits no tenderness. Abdominal: Soft. Bowel sounds are normal. She exhibits no distension and no mass. There is no tenderness. Musculoskeletal: Normal range of motion. She exhibits no edema or tenderness. Neurological: She is alert and oriented to person, place, and time. No cranial nerve deficit. Skin: Skin is warm and dry. No rash noted. She is not diaphoretic. No erythema. No pallor. Psychiatric: She has a normal mood and affect. Her behavior is normal. Thought content normal.   Nursing note and vitals reviewed. MDM  Number of Diagnoses or Management Options  Diagnosis management comments: Assessment: Bronchitis possibly from aspiration of lemon cake. Blood-tinged sputum small amount. Chest x-ray normal we'll send her home on prednisone cough medicine she is to return if she is has a fever cough gets worse.        Amount and/or Complexity of Data Reviewed  Tests in the radiology section of CPT®: ordered and reviewed    Risk of Complications, Morbidity, and/or Mortality  Presenting problems: low  Diagnostic procedures: low  Management options: low    Patient Progress  Patient progress: stable         Procedures

## 2019-06-04 NOTE — DISCHARGE INSTRUCTIONS
Patient Education        Coughing Up Blood: Care Instructions  Your Care Instructions    Coughing up blood can be frightening. The blood may come from the lungs, stomach, or throat. You may cough up a few thin streaks of bright red blood. This probably is not a cause for concern. Coughing up large amounts of bright red blood or rust-colored mucus from the lungs can be a symptom of a more serious condition. Several conditions can make you cough up blood from the lungs. These include bronchitis and pneumonia, or more serious problems such as cancer or a blood clot in the lung (pulmonary embolus). Depending on what is causing your cough, it may go away after the illness is treated. Your doctor may tell you not to suppress the cough with cough medicine if it is better for you to cough up the blood and spit it out. Follow-up care is a key part of your treatment and safety. Be sure to make and go to all appointments, and call your doctor if you are having problems. It's also a good idea to know your test results and keep a list of the medicines you take. How can you care for yourself at home? · Make a note of when and for how long you cough up blood. Also note if you are coughing up spit with a small amount of blood, or mostly blood. Take this information to your next appointment with your doctor. · Increase your fluid intake to at least 8 to 10 glasses of water every day. This helps keep the mucus thin and helps you cough it up. If you have kidney, heart, or liver disease and have to limit fluids, talk with your doctor before you increase your fluid intake. · If your doctor prescribed antibiotics, take them as directed. Do not stop taking them just because you feel better. You need to take the full course of antibiotics. · Do not take cough medicine without your doctor's guidance. They can cause problems if you have other health problems. They can also interact with other medicine.   · Do not smoke or use other forms of tobacco, especially while you have a cough. Smoking can make coughing worse. If you need help quitting, talk to your doctor about stop-smoking programs and medicines. These can increase your chances of quitting for good. · Avoid exposure to smoke, dust, or other pollutants. When should you call for help? Call 911 anytime you think you may need emergency care. For example, call if:    · You have sudden chest pain and shortness of breath.     · You have severe trouble breathing.    Call your doctor now or seek immediate medical care if:    · You have wheezing and difficulty breathing.     · You are dizzy or lightheaded, or you feel like you may faint.     · You cough up clots of blood.    Watch closely for changes in your health, and be sure to contact your doctor if:    · You do not get better as expected.     · You have any new symptoms, such as chest pain with difficulty breathing or a fever. Where can you learn more? Go to http://pavel-dimitrios.info/. Enter M550 in the search box to learn more about \"Coughing Up Blood: Care Instructions. \"  Current as of: September 23, 2018  Content Version: 11.9  © 0452-7610 [employer]. Care instructions adapted under license by [employer] (which disclaims liability or warranty for this information). If you have questions about a medical condition or this instruction, always ask your healthcare professional. [address] any warranty or liability for your use of this information.

## 2019-06-04 NOTE — ED TRIAGE NOTES
PT sts \"Yesterday I got up and I was regurgitating and I coughed and this blood came out. All last night I had trouble breathing. And this morning I woke up and was coughing and saw some blood again. I am supposed to have a procedure done where they go do and stretch my throat and look at [facility] later this month. \"    PT presents ambulatory to triage in mild distress. PT confirms being a smoker.  Pt confirms hx of HTN and sts she took her blood pressure meds about an hour PTA

## 2019-06-04 NOTE — ED NOTES
I have reviewed discharge instructions with the patient. The patient verbalized understanding. Patient left ED via Discharge Method: ambulatory to Home with self    Opportunity for questions and clarification provided. Patient given  scripts. To continue your aftercare when you leave the hospital, you may receive an automated call from our care team to check in on how you are doing. This is a free service and part of our promise to provide the best care and service to meet your aftercare needs.  If you have questions, or wish to unsubscribe from this service please call [phone]. Thank you for Choosing our [facility] Emergency Department.

## 2019-06-06 ENCOUNTER — APPOINTMENT (OUTPATIENT)
Dept: GENERAL RADIOLOGY | Age: 39
End: 2019-06-06
Attending: EMERGENCY MEDICINE
Payer: MEDICARE

## 2019-06-06 ENCOUNTER — HOSPITAL ENCOUNTER (EMERGENCY)
Age: 39
Discharge: HOME | End: 2019-06-06
Attending: EMERGENCY MEDICINE
Payer: MEDICARE

## 2019-06-06 VITALS
OXYGEN SATURATION: 99 % | BODY MASS INDEX: 37.49 KG/M2 | RESPIRATION RATE: 16 BRPM | DIASTOLIC BLOOD PRESSURE: 89 MMHG | HEIGHT: 65 IN | WEIGHT: 225 LBS | SYSTOLIC BLOOD PRESSURE: 142 MMHG | HEART RATE: 80 BPM | TEMPERATURE: 97.5 F

## 2019-06-06 DIAGNOSIS — Z20.1 EXPOSURE TO TB: Primary | ICD-10-CM

## 2019-06-06 PROCEDURE — 99283 EMERGENCY DEPT VISIT LOW MDM: CPT | Performed by: EMERGENCY MEDICINE

## 2019-06-06 PROCEDURE — 86480 TB TEST CELL IMMUN MEASURE: CPT

## 2019-06-06 PROCEDURE — 71046 X-RAY EXAM CHEST 2 VIEWS: CPT

## 2019-06-06 NOTE — ED NOTES
I have reviewed discharge instructions with the patient. The patient verbalized understanding. Patient left ED via Discharge Method: ambulatory to Home with spouse. Opportunity for questions and clarification provided. Patient given 0 scripts. To continue your aftercare when you leave the hospital, you may receive an automated call from our care team to check in on how you are doing. This is a free service and part of our promise to provide the best care and service to meet your aftercare needs.  If you have questions, or wish to unsubscribe from this service please call [phone]. Thank you for Choosing our [facility] Emergency Department.

## 2019-06-06 NOTE — ED PROVIDER NOTES
[facility] Emergency Department  Arrival Date/Time: No admission date for patient encounter. Eli Vanegas  MRN: [de-identified]    YOB: 1980   37 y.o. female    [facility] EMERGENCY DEPT Room/bed info not found  Seen on 6/6/2019 @ 3:59 PM      TRIAGE Provider NOTE:  Cough, phlegm, night sweats, had CXR and dx pna on Monday. Given prednisone, abx.  2 weeks ago exposed to a person who was quarantined for TB. Here with  for same. Spoke with Dr. Suyapa Dave with pulmonary, recommended gold quantaferon blood test.  Lab states 3-5 days to result. If CXR neg, can f/u with health dept for blood test and decision on tx. If cxr positive will need admission/isolation. Pt placed in N-95 mask. Arrived to room wearing blue mask. I reviewed pt's chart and she had CXR with R sided diffuse infiltrate and was seen for hemoptysis. Dr. Davis Au documented that she had blood tinged sputum and thought CXR findings due to aspiration PNA c/w her story of cough starting after choking on lemon cake. Sariah Deng MD; 6/6/2019 @3:59 PM============================     Eli Vanegas is a 37 y.o. female seen on 6/6/2019 at 3:59 PM in the [facility] EMERGENCY DEPT     HPI: Patient complains of exposure to TB. Friend of her  from [facility] [address] is quarantined at [facility] for TB treatment. She did have a recent illness that presented as cough and sweats. She was seen here and had infiltrate on her CXR. She has been taking antibiotics and is improved. She is a smoker and has a chronic cough. She had hemoptysis with the recent illness that caused her to come in. This has resolved. Review of Systems: Review of Systems   Constitutional: Negative. HENT: Negative. Respiratory: Positive for cough. Negative for shortness of breath. Cardiovascular: Negative. Gastrointestinal: Negative. Genitourinary: Negative. Musculoskeletal: Negative. Neurological: Negative. Hematological: Negative. Psychiatric/Behavioral: Negative. PAST MEDICAL HISTORY:  Primary Care Doctor: Denae Bajwa [phone]  Past Medical History:   Diagnosis Date    Dyspnea     with fluid volume overload on hospitilization 5/29/13; responded to diuresis    GERD (gastroesophageal reflux disease)     occ episode when drinks milk    Hypertension 6/11/13    recently obtained insurance, not yet medicated    Kidney stone     Psychiatric disorder     bipolar/ schizo-effective disorder    Snores     suspect sleep apnea, not yet had sleep study     Past Surgical History:   Procedure Laterality Date    HX UROLOGICAL  5/29/13    cystoscopy with stent     Social History     Socioeconomic History    Marital status:      Spouse name: Not on file    Number of children: Not on file    Years of education: Not on file    Highest education level: Not on file   Tobacco Use    Smoking status: Current Every Day Smoker     Packs/day: 0.50     Years: 15.00     Pack years: 7.50    Smokeless tobacco: Never Used   Substance and Sexual Activity    Alcohol use: No    Drug use: Not Currently     Types: Marijuana, Methamphetamines, Cocaine     Comment: History of not current use     Sexual activity: Yes     Partners: Male     Birth control/protection: None     Cannot display prior to admission medications because the patient has not been admitted in this contact. Allergies   Allergen Reactions    Lisinopril Anaphylaxis         Physical Exam:  Nursing documentation reviewed. There were no vitals filed for this visit. Vital signs were reviewed. Physical Exam   Constitutional: She is oriented to person, place, and time. She appears well-developed and well-nourished. HENT:   Head: Normocephalic and atraumatic. Mouth/Throat: Oropharynx is clear and moist.   Eyes: Pupils are equal, round, and reactive to light. Conjunctivae and EOM are normal. No scleral icterus. Neck: Normal range of motion. Neck supple. No JVD present. Cardiovascular: Normal rate, regular rhythm, normal heart sounds and intact distal pulses. Pulmonary/Chest: Effort normal and breath sounds normal.   Musculoskeletal: Normal range of motion. She exhibits no edema or tenderness. Neurological: She is alert and oriented to person, place, and time. Skin: Skin is warm and dry. Psychiatric: She has a normal mood and affect. Her behavior is normal.   Nursing note and vitals reviewed. Medical Decision Making  MDM  Number of Diagnoses or Management Options  Exposure to TB:   Diagnosis management comments: CXR is now clear. Quantiferon gold drawn and sent to lab  Follow up with Health Department  Continue to try to stop smoking  Instructions discussed with patient and        Amount and/or Complexity of Data Reviewed  Clinical lab tests: ordered  Tests in the radiology section of CPT®: ordered and reviewed    Patient Progress  Patient progress: stable        Procedures     ED Evaluation:  LABS: No results found for this or any previous visit (from the past 24 hour(s)).      RADIOLOGY:   No orders to display     _____________________________________________________________________

## 2019-06-06 NOTE — ED TRIAGE NOTES
Pt's  was recently exposed to person who has been quarantined for TB. Pt was recently diagnosed with pneumonia. Pt is experiencing productive cough and night sweats. Does not think she has had rapid weight loss. Having chills. On abx and prednisone.

## 2019-06-25 PROBLEM — E11.40 TYPE 2 DIABETES MELLITUS WITH DIABETIC NEUROPATHY (HCC): Status: ACTIVE | Noted: 2019-06-25

## 2019-08-02 ENCOUNTER — HOSPITAL ENCOUNTER (OUTPATIENT)
Dept: SLEEP MEDICINE | Age: 39
Discharge: HOME | End: 2019-08-02
Payer: MEDICARE

## 2019-08-02 PROCEDURE — 95806 SLEEP STUDY UNATT&RESP EFFT: CPT

## 2019-08-13 PROBLEM — N92.1 MENORRHAGIA WITH IRREGULAR CYCLE: Status: ACTIVE | Noted: 2019-08-13

## 2019-08-19 PROBLEM — G56.01 CARPAL TUNNEL SYNDROME ON RIGHT: Status: ACTIVE | Noted: 2019-08-19

## 2019-10-01 ENCOUNTER — HOSPITAL ENCOUNTER (OUTPATIENT)
Dept: GENERAL RADIOLOGY | Age: 39
Discharge: HOME | End: 2019-10-01
Attending: OTOLARYNGOLOGY
Payer: MEDICARE

## 2019-10-01 DIAGNOSIS — R13.10 DYSPHAGIA, UNSPECIFIED TYPE: ICD-10-CM

## 2019-10-01 PROCEDURE — 92611 MOTION FLUOROSCOPY/SWALLOW: CPT

## 2019-10-01 PROCEDURE — 74011000255 HC RX REV CODE- 255: Performed by: OTOLARYNGOLOGY

## 2019-10-01 PROCEDURE — 92610 EVALUATE SWALLOWING FUNCTION: CPT

## 2019-10-01 PROCEDURE — 74230 X-RAY XM SWLNG FUNCJ C+: CPT

## 2019-10-01 RX ADMIN — BARIUM SULFATE 30 ML: 980 POWDER, FOR SUSPENSION ORAL at 09:36

## 2019-10-01 RX ADMIN — BARIUM SULFATE 15 ML: 400 PASTE ORAL at 09:36

## 2019-10-01 NOTE — THERAPY EVALUATION
Natividad Sevilla  : 1980  Primary: [place]  Secondary: [place] [facility] [address] [facility], [place] [address] [address] [facility]  [phone]   [phone]       OUTPATIENT SPEECH LANGUAGE PATHOLOGY: MODIFIED BARIUM SWALLOW    ICD-10: Treatment Diagnosis: Pharyngoesophageal dysphagia (R13.14)  DATE: 10/1/2019  REFERRING PHYSICIAN: Lisseth Serrano DO MD Orders: Modifed Barium Swallow  PAST MEDICAL HISTORY:   Ms. Deng is a 37 y.o. female who  has a past medical history of Carpal tunnel syndrome on right (2019), Diabetes ([address] Dry mouth, Dyspnea, GERD (gastroesophageal reflux disease), Herpes simplex type 2 infection (2019), History of multiple allergies, Hypercholesterolemia, Hypertension (13), Kidney stone, Otitis media, Psychiatric disorder, Reflux gastritis, Sinus problem, and Snores. She also has no past medical history of Asthma. She also  has a past surgical history that includes hx urological (13). RADIOLOGIST:  Dr. Franchesca Montoya  MEDICAL/REFERRING DIAGNOSIS: Dysphagia, unspecified type [R13.10]  PRECAUTIONS/ALLERGIES: Lisinopril   ASSESSMENT/PLAN OF CARE:Based on the objective data described above, Ms. Deng presents with slight oropharyngeal dysphagia characterized by laryngeal penetration with serial swallows of thin liquids by cup and straw. No laryngeal penetration or aspiration observed with single sips of liquid (cup or straw), pudding, mixed consistency or cracker. Swallows of all textures timely with adequate laryngeal excursion and no pharyngeal residue after swallow.     RECOMMENDATIONS AND PLANNED INTERVENTIONS  DIET:    continue prescribed diet  MEDICATIONS: With liquid    COMPENSATORY STRATEGIES/MODIFICATIONS INCLUDING:  · Upright for all PO  · Small bites and sips  OTHER RECOMMENDATIONS (including follow up treatment recommendations):   · Patient provided with written laryngeal strengthening and coordination exercises to perform on her own  · If after attempting exercises and compensatory strategies on her own, she is still having swallowing difficulties, consider formal swallowing therapy with SLP    Thank you for this referral,  Jose R Noriega MA, CCC-SLP      SUBJECTIVE:  Present Dysphagia Symptoms: She currently complains of globus sensation of solids and occasional choking with liquids. .   Previous Dysphagia: She has a history of esophageal dilation. History of reflux:  YES      Previous Modified Barium Swallow studies: no prior MBS, but patient had barium swallow/esophagram 11/2018 which was unremarkable. Current dietary status prior to evaluation today:  Regular textures, thin liquids    OBJECTIVE:Orientation:   Person  Place  Time  Situation    Oral Assessment:  Labial: No impairment  Dentition: Natural  Oral Hygiene: Adequate  Lingual: No impairment  Vocal Quality: WFL  Speech Inteligiblity: WFL    Modified barium swallow study was performed in the radiology suite with Ms. Romero seated in the upright lateral plane. To evaluate her swallow function, barium coated liquid and food was administered in the form of thin liquids (by spoon, cup sip, cup gulp, straw sip and serial swallows), pudding, mixed consistency and cracker. Oral phase of swallow was characterized by no significant oral issues observed. Pharyngeal phase of swallow was characterized by functional pharyngeal swallow. Laryngeal penetration occurred with thin liquids (by serial swallows). Aspiration/Penetration Scale: 2 (Penetration/no residue. Contrast enters the larynx, remains above the folds/cords, and is cleared.)    Cervical esophageal phase of swallow was characterized by adequate and timely clearance of all boluses through cervical esophagus. Distal esophagus not assessed due to limitations of MBS study. Assessment only; no treatment provided today. Objective Measure:   Tool Used: National Outcomes Measurement System: Functional Communication Measures: SWALLOWING  Score:  Initial: 6     Interpretation of Tool: This measure describes the change in functional communication status subsequent to speech-language pathology treatment of patients with dysphagia.  Level 1:  Individual is not able to swallow anything safely by mouth. All nutrition and hydration is received through non-oral means (e.g., nasogastric tube, PEG).  Level 2: Individual is not able to swallow safely by mouth for nutrition and hydration, but may take some consistency with consistent maximal cues in therapy only. Alternative method of feeding required.  Level 3:  Alternative method of feeding required as individual takes less than 50% of nutrition and hydration by mouth, and/or swallowing is safe with consistent use of moderate cues to use compensatory strategies and/or requires maximum diet restriction.  Level 4:  Swallowing is safe, but usually requires moderate cues to use compensatory strategies, and/or the individual has moderate diet restrictions and/or still requires tube feeding and/or oral supplements.  Level 5:  Swallowing is safe with minimal diet restriction and/or occasionally requires minimal cueing to use compensatory strategies. The individual may occasionally self-cue. All nutrition and hydration needs are met by mouth at mealtime.  Level 6:  Swallowing is safe, and the individual eats and drinks independently and may rarely require minimal cueing. The individual usually self-cues when difficulty occurs. May need to avoid specific food items (e.g., popcorn and nuts), or require additional time (due to dysphagia).  Level 7: The individuals ability to eat independently is not limited by swallow function. Swallowing would be safe and efficient for all consistencies. Compensatory strategies are effectively used when needed.     Patient/Family education:    The patient was educated on the following topics: anatomy and physiology of the swallowing mechanism and results and recommendations from this assessment. All questions were answered at this time and comprehension of education was expressed by the patient. Recommendations for treatment: no formal treatment currently recommended; patient provided with laryngeal exercises and encouraged to take single sips of liquids. If dysphagia symptoms persist, consider formal swallowing therapy.     Total Treatment Duration:  Time In: 0915   Time Out: KIP Herrera, CCC-SLP

## 2019-11-04 ENCOUNTER — HOSPITAL ENCOUNTER (OUTPATIENT)
Dept: DIABETES SERVICES | Age: 39
Discharge: HOME | End: 2019-11-04
Payer: MEDICARE

## 2019-11-04 PROCEDURE — G0108 DIAB MANAGE TRN  PER INDIV: HCPCS

## 2019-11-12 ENCOUNTER — HOSPITAL ENCOUNTER (OUTPATIENT)
Dept: DIABETES SERVICES | Age: 39
Discharge: HOME | End: 2019-11-12
Payer: MEDICARE

## 2019-11-12 PROCEDURE — G0109 DIAB MANAGE TRN IND/GROUP: HCPCS

## 2019-11-26 ENCOUNTER — HOSPITAL ENCOUNTER (OUTPATIENT)
Dept: DIABETES SERVICES | Age: 39
Discharge: HOME | End: 2019-11-26
Payer: MEDICARE

## 2019-11-26 PROCEDURE — G0109 DIAB MANAGE TRN IND/GROUP: HCPCS

## 2019-12-11 ENCOUNTER — TELEPHONE (OUTPATIENT)
Dept: DIABETES SERVICES | Age: 39
End: 2019-12-11

## 2019-12-11 NOTE — TELEPHONE ENCOUNTER
Pt was a no show for nutrition diabetes #2 class today. Called and talked with pt who states she is sick. Pt requested we call her back at a later time to reschedule.

## 2019-12-13 ENCOUNTER — HOSPITAL ENCOUNTER (OUTPATIENT)
Dept: CT IMAGING | Age: 39
Discharge: HOME | End: 2019-12-13
Attending: FAMILY MEDICINE

## 2019-12-13 DIAGNOSIS — R31.9 HEMATURIA, UNSPECIFIED TYPE: ICD-10-CM

## 2019-12-13 DIAGNOSIS — R10.9 FLANK PAIN: ICD-10-CM

## 2019-12-18 ENCOUNTER — TELEPHONE (OUTPATIENT)
Dept: DIABETES SERVICES | Age: 39
End: 2019-12-18

## 2019-12-18 NOTE — TELEPHONE ENCOUNTER
Patient no show for Diabetes Two class. Reports she has a kidney stone and trying to see specialist.  She will call back after first of year to reschedule class.

## 2020-02-16 ENCOUNTER — HOSPITAL ENCOUNTER (EMERGENCY)
Age: 40
Discharge: HOME | End: 2020-02-17
Attending: EMERGENCY MEDICINE
Payer: MEDICARE

## 2020-02-16 DIAGNOSIS — R11.2 NON-INTRACTABLE VOMITING WITH NAUSEA, UNSPECIFIED VOMITING TYPE: ICD-10-CM

## 2020-02-16 DIAGNOSIS — R10.11 RUQ ABDOMINAL PAIN: ICD-10-CM

## 2020-02-16 DIAGNOSIS — R10.13 ABDOMINAL PAIN, EPIGASTRIC: Primary | ICD-10-CM

## 2020-02-16 PROCEDURE — 85025 COMPLETE CBC W/AUTO DIFF WBC: CPT

## 2020-02-16 PROCEDURE — 80053 COMPREHEN METABOLIC PANEL: CPT

## 2020-02-16 PROCEDURE — 96361 HYDRATE IV INFUSION ADD-ON: CPT

## 2020-02-16 PROCEDURE — 81003 URINALYSIS AUTO W/O SCOPE: CPT

## 2020-02-16 PROCEDURE — 96375 TX/PRO/DX INJ NEW DRUG ADDON: CPT

## 2020-02-16 PROCEDURE — 96374 THER/PROPH/DIAG INJ IV PUSH: CPT

## 2020-02-16 PROCEDURE — 83690 ASSAY OF LIPASE: CPT

## 2020-02-16 PROCEDURE — 99285 EMERGENCY DEPT VISIT HI MDM: CPT

## 2020-02-17 ENCOUNTER — APPOINTMENT (OUTPATIENT)
Dept: ULTRASOUND IMAGING | Age: 40
End: 2020-02-17
Attending: EMERGENCY MEDICINE
Payer: MEDICARE

## 2020-02-17 VITALS
TEMPERATURE: 98.5 F | SYSTOLIC BLOOD PRESSURE: 131 MMHG | HEIGHT: 64 IN | HEART RATE: 87 BPM | WEIGHT: 225 LBS | RESPIRATION RATE: 16 BRPM | BODY MASS INDEX: 38.41 KG/M2 | OXYGEN SATURATION: 98 % | DIASTOLIC BLOOD PRESSURE: 72 MMHG

## 2020-02-17 LAB
ALBUMIN SERPL-MCNC: 4 G/DL (ref 3.5–5)
ALBUMIN/GLOB SERPL: 1 {RATIO} (ref 1.2–3.5)
ALP SERPL-CCNC: 74 U/L (ref 50–136)
ALT SERPL-CCNC: 25 U/L (ref 12–65)
ANION GAP SERPL CALC-SCNC: 7 MMOL/L (ref 7–16)
AST SERPL-CCNC: 10 U/L (ref 15–37)
BASOPHILS # BLD: 0 K/UL (ref 0–0.2)
BASOPHILS NFR BLD: 0 % (ref 0–2)
BILIRUB SERPL-MCNC: 0.6 MG/DL (ref 0.2–1.1)
BUN SERPL-MCNC: 13 MG/DL (ref 6–23)
CALCIUM SERPL-MCNC: 9.6 MG/DL (ref 8.3–10.4)
CHLORIDE SERPL-SCNC: 102 MMOL/L (ref 98–107)
CO2 SERPL-SCNC: 30 MMOL/L (ref 21–32)
CREAT SERPL-MCNC: 0.93 MG/DL (ref 0.6–1)
DIFFERENTIAL METHOD BLD: ABNORMAL
EOSINOPHIL # BLD: 0.2 K/UL (ref 0–0.8)
EOSINOPHIL NFR BLD: 2 % (ref 0.5–7.8)
ERYTHROCYTE [DISTWIDTH] IN BLOOD BY AUTOMATED COUNT: 15.3 % (ref 11.9–14.6)
GLOBULIN SER CALC-MCNC: 4.1 G/DL (ref 2.3–3.5)
GLUCOSE SERPL-MCNC: 124 MG/DL (ref 65–100)
HCT VFR BLD AUTO: 48 % (ref 35.8–46.3)
HGB BLD-MCNC: 15.3 G/DL (ref 11.7–15.4)
IMM GRANULOCYTES # BLD AUTO: 0 K/UL (ref 0–0.5)
IMM GRANULOCYTES NFR BLD AUTO: 0 % (ref 0–5)
LIPASE SERPL-CCNC: 116 U/L (ref 73–393)
LYMPHOCYTES # BLD: 3.1 K/UL (ref 0.5–4.6)
LYMPHOCYTES NFR BLD: 30 % (ref 13–44)
MCH RBC QN AUTO: 27.5 PG (ref 26.1–32.9)
MCHC RBC AUTO-ENTMCNC: 31.9 G/DL (ref 31.4–35)
MCV RBC AUTO: 86.2 FL (ref 79.6–97.8)
MONOCYTES # BLD: 0.5 K/UL (ref 0.1–1.3)
MONOCYTES NFR BLD: 5 % (ref 4–12)
NEUTS SEG # BLD: 6.4 K/UL (ref 1.7–8.2)
NEUTS SEG NFR BLD: 63 % (ref 43–78)
NRBC # BLD: 0 K/UL (ref 0–0.2)
PLATELET # BLD AUTO: 280 K/UL (ref 150–450)
PMV BLD AUTO: 9.9 FL (ref 9.4–12.3)
POTASSIUM SERPL-SCNC: 3.5 MMOL/L (ref 3.5–5.1)
PROT SERPL-MCNC: 8.1 G/DL (ref 6.3–8.2)
RBC # BLD AUTO: 5.57 M/UL (ref 4.05–5.2)
SODIUM SERPL-SCNC: 139 MMOL/L (ref 136–145)
WBC # BLD AUTO: 10.3 K/UL (ref 4.3–11.1)

## 2020-02-17 PROCEDURE — 76705 ECHO EXAM OF ABDOMEN: CPT

## 2020-02-17 PROCEDURE — 74011250637 HC RX REV CODE- 250/637: Performed by: EMERGENCY MEDICINE

## 2020-02-17 PROCEDURE — 74011000250 HC RX REV CODE- 250: Performed by: EMERGENCY MEDICINE

## 2020-02-17 PROCEDURE — 74011250636 HC RX REV CODE- 250/636: Performed by: EMERGENCY MEDICINE

## 2020-02-17 RX ORDER — FAMOTIDINE 20 MG/1
40 TABLET, FILM COATED ORAL
Status: COMPLETED | OUTPATIENT
Start: 2020-02-17 | End: 2020-02-17

## 2020-02-17 RX ORDER — DICYCLOMINE HYDROCHLORIDE 20 MG/1
20 TABLET ORAL
Status: COMPLETED | OUTPATIENT
Start: 2020-02-17 | End: 2020-02-17

## 2020-02-17 RX ORDER — KETOROLAC TROMETHAMINE 30 MG/ML
15 INJECTION, SOLUTION INTRAMUSCULAR; INTRAVENOUS
Status: COMPLETED | OUTPATIENT
Start: 2020-02-17 | End: 2020-02-17

## 2020-02-17 RX ORDER — LIDOCAINE HYDROCHLORIDE 20 MG/ML
15 SOLUTION OROPHARYNGEAL
Status: COMPLETED | OUTPATIENT
Start: 2020-02-17 | End: 2020-02-17

## 2020-02-17 RX ORDER — DICYCLOMINE HYDROCHLORIDE 10 MG/1
10 CAPSULE ORAL
Qty: 20 CAP | Refills: 0 | Status: SHIPPED | OUTPATIENT
Start: 2020-02-17 | End: 2020-02-22

## 2020-02-17 RX ORDER — MAG HYDROX/ALUMINUM HYD/SIMETH 200-200-20
30 SUSPENSION, ORAL (FINAL DOSE FORM) ORAL
Status: COMPLETED | OUTPATIENT
Start: 2020-02-17 | End: 2020-02-17

## 2020-02-17 RX ORDER — METOCLOPRAMIDE 10 MG/1
10 TABLET ORAL
Qty: 30 TAB | Refills: 0 | Status: SHIPPED | OUTPATIENT
Start: 2020-02-17 | End: 2020-07-28 | Stop reason: ALTCHOICE

## 2020-02-17 RX ORDER — ONDANSETRON 2 MG/ML
4 INJECTION INTRAMUSCULAR; INTRAVENOUS
Status: COMPLETED | OUTPATIENT
Start: 2020-02-17 | End: 2020-02-17

## 2020-02-17 RX ORDER — METOCLOPRAMIDE HYDROCHLORIDE 5 MG/ML
10 INJECTION INTRAMUSCULAR; INTRAVENOUS
Status: COMPLETED | OUTPATIENT
Start: 2020-02-17 | End: 2020-02-17

## 2020-02-17 RX ADMIN — METOCLOPRAMIDE 10 MG: 5 INJECTION, SOLUTION INTRAMUSCULAR; INTRAVENOUS at 03:17

## 2020-02-17 RX ADMIN — KETOROLAC TROMETHAMINE 15 MG: 30 INJECTION, SOLUTION INTRAMUSCULAR at 02:27

## 2020-02-17 RX ADMIN — ONDANSETRON 4 MG: 2 INJECTION INTRAMUSCULAR; INTRAVENOUS at 00:26

## 2020-02-17 RX ADMIN — FAMOTIDINE 40 MG: 20 TABLET, FILM COATED ORAL at 00:25

## 2020-02-17 RX ADMIN — LIDOCAINE HYDROCHLORIDE 15 ML: 20 SOLUTION ORAL; TOPICAL at 00:25

## 2020-02-17 RX ADMIN — SODIUM CHLORIDE 1000 ML: 900 INJECTION, SOLUTION INTRAVENOUS at 00:25

## 2020-02-17 RX ADMIN — ALUMINUM HYDROXIDE, MAGNESIUM HYDROXIDE, AND SIMETHICONE 30 ML: 200; 200; 20 SUSPENSION ORAL at 00:25

## 2020-02-17 RX ADMIN — DICYCLOMINE HYDROCHLORIDE 20 MG: 20 TABLET ORAL at 00:26

## 2020-02-17 NOTE — DISCHARGE INSTRUCTIONS
Follow-up with your family doctor in the next 5 to 7 days for repeat evaluation if your symptoms persist.  Take the medications as prescribed as needed. Eat a bland diet for the next 2 to 3 days.
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
## 2020-02-17 NOTE — ED NOTES
Pt back from [facility]. In room actively vomiting. Dr. Trujillo Ill made aware. Verbal order for 10mg Reglan IV received. Repeated back to DO to confirm. Order placed.

## 2020-02-17 NOTE — ED TRIAGE NOTES
Patient arrives to ED from home complaining of \"viral symptoms\". Patient complains of diarrhea, \"sour belching\", and abdominal pain. Patient states she is tender in left upper quadrant. Patient still has her gall bladder.

## 2020-02-17 NOTE — ED NOTES
I have reviewed discharge instructions with the patient. The patient verbalized understanding. Patient left ED via Discharge Method: ambulatory to Home with spouse  Opportunity for questions and clarification provided. Patient given 2 scripts. To continue your aftercare when you leave the hospital, you may receive an automated call from our care team to check in on how you are doing. This is a free service and part of our promise to provide the best care and service to meet your aftercare needs.  If you have questions, or wish to unsubscribe from this service please call [phone]. Thank you for Choosing our [facility] Emergency Department.

## 2020-06-03 ENCOUNTER — DOCUMENTATION ONLY (OUTPATIENT)
Dept: DIABETES SERVICES | Age: 40
End: 2020-06-03

## 2020-06-03 NOTE — PROGRESS NOTES
Called LM about Diabetes Education. Referral dated 10-15-20. Instructed to call back if interested. Diabetes File closed at this time.

## 2020-10-11 ENCOUNTER — APPOINTMENT (OUTPATIENT)
Dept: GENERAL RADIOLOGY | Age: 40
End: 2020-10-11
Attending: EMERGENCY MEDICINE
Payer: MEDICARE

## 2020-10-11 ENCOUNTER — HOSPITAL ENCOUNTER (EMERGENCY)
Age: 40
Discharge: HOME | End: 2020-10-11
Attending: EMERGENCY MEDICINE
Payer: MEDICARE

## 2020-10-11 VITALS
DIASTOLIC BLOOD PRESSURE: 72 MMHG | RESPIRATION RATE: 20 BRPM | TEMPERATURE: 98.5 F | OXYGEN SATURATION: 93 % | SYSTOLIC BLOOD PRESSURE: 106 MMHG | HEART RATE: 97 BPM

## 2020-10-11 DIAGNOSIS — B34.9 VIRAL SYNDROME: ICD-10-CM

## 2020-10-11 DIAGNOSIS — F19.10 POLYSUBSTANCE ABUSE (HCC): ICD-10-CM

## 2020-10-11 DIAGNOSIS — N39.0 URINARY TRACT INFECTION WITHOUT HEMATURIA, SITE UNSPECIFIED: Primary | ICD-10-CM

## 2020-10-11 LAB
ALBUMIN SERPL-MCNC: 3.2 G/DL (ref 3.5–5)
ALBUMIN/GLOB SERPL: 0.7 {RATIO} (ref 1.2–3.5)
ALP SERPL-CCNC: 70 U/L (ref 50–136)
ALT SERPL-CCNC: 14 U/L (ref 12–65)
ANION GAP SERPL CALC-SCNC: 4 MMOL/L (ref 7–16)
AST SERPL-CCNC: 6 U/L (ref 15–37)
BACTERIA URNS QL MICRO: ABNORMAL /HPF
BASOPHILS # BLD: 0 K/UL (ref 0–0.2)
BASOPHILS NFR BLD: 0 % (ref 0–2)
BILIRUB SERPL-MCNC: 0.4 MG/DL (ref 0.2–1.1)
BUN SERPL-MCNC: 10 MG/DL (ref 6–23)
CALCIUM SERPL-MCNC: 9.1 MG/DL (ref 8.3–10.4)
CASTS URNS QL MICRO: 0 /LPF
CHLORIDE SERPL-SCNC: 99 MMOL/L (ref 98–107)
CO2 SERPL-SCNC: 32 MMOL/L (ref 21–32)
CREAT SERPL-MCNC: 1.03 MG/DL (ref 0.6–1)
CRYSTALS URNS QL MICRO: 0 /LPF
DIFFERENTIAL METHOD BLD: ABNORMAL
EOSINOPHIL # BLD: 0 K/UL (ref 0–0.8)
EOSINOPHIL NFR BLD: 0 % (ref 0.5–7.8)
EPI CELLS #/AREA URNS HPF: ABNORMAL /HPF
ERYTHROCYTE [DISTWIDTH] IN BLOOD BY AUTOMATED COUNT: 14 % (ref 11.9–14.6)
GLOBULIN SER CALC-MCNC: 4.8 G/DL (ref 2.3–3.5)
GLUCOSE SERPL-MCNC: 177 MG/DL (ref 65–100)
HCT VFR BLD AUTO: 44.6 % (ref 35.8–46.3)
HGB BLD-MCNC: 14.4 G/DL (ref 11.7–15.4)
IMM GRANULOCYTES # BLD AUTO: 0 K/UL (ref 0–0.5)
IMM GRANULOCYTES NFR BLD AUTO: 0 % (ref 0–5)
LYMPHOCYTES # BLD: 2.4 K/UL (ref 0.5–4.6)
LYMPHOCYTES NFR BLD: 22 % (ref 13–44)
MCH RBC QN AUTO: 28 PG (ref 26.1–32.9)
MCHC RBC AUTO-ENTMCNC: 32.3 G/DL (ref 31.4–35)
MCV RBC AUTO: 86.8 FL (ref 79.6–97.8)
MONOCYTES # BLD: 0.7 K/UL (ref 0.1–1.3)
MONOCYTES NFR BLD: 6 % (ref 4–12)
MUCOUS THREADS URNS QL MICRO: 0 /LPF
NEUTS SEG # BLD: 7.9 K/UL (ref 1.7–8.2)
NEUTS SEG NFR BLD: 71 % (ref 43–78)
NRBC # BLD: 0 K/UL (ref 0–0.2)
PLATELET # BLD AUTO: 237 K/UL (ref 150–450)
PMV BLD AUTO: 9.7 FL (ref 9.4–12.3)
POTASSIUM SERPL-SCNC: 3.5 MMOL/L (ref 3.5–5.1)
PROT SERPL-MCNC: 8 G/DL (ref 6.3–8.2)
RBC # BLD AUTO: 5.14 M/UL (ref 4.05–5.2)
RBC #/AREA URNS HPF: ABNORMAL /HPF
SODIUM SERPL-SCNC: 135 MMOL/L (ref 136–145)
WBC # BLD AUTO: 11 K/UL (ref 4.3–11.1)
WBC URNS QL MICRO: ABNORMAL /HPF

## 2020-10-11 PROCEDURE — 81015 MICROSCOPIC EXAM OF URINE: CPT

## 2020-10-11 PROCEDURE — 87086 URINE CULTURE/COLONY COUNT: CPT

## 2020-10-11 PROCEDURE — 85025 COMPLETE CBC W/AUTO DIFF WBC: CPT

## 2020-10-11 PROCEDURE — 99283 EMERGENCY DEPT VISIT LOW MDM: CPT

## 2020-10-11 PROCEDURE — 96375 TX/PRO/DX INJ NEW DRUG ADDON: CPT

## 2020-10-11 PROCEDURE — 87186 SC STD MICRODIL/AGAR DIL: CPT

## 2020-10-11 PROCEDURE — 71045 X-RAY EXAM CHEST 1 VIEW: CPT

## 2020-10-11 PROCEDURE — 74011250636 HC RX REV CODE- 250/636: Performed by: EMERGENCY MEDICINE

## 2020-10-11 PROCEDURE — 80307 DRUG TEST PRSMV CHEM ANLYZR: CPT

## 2020-10-11 PROCEDURE — 80053 COMPREHEN METABOLIC PANEL: CPT

## 2020-10-11 PROCEDURE — 87635 SARS-COV-2 COVID-19 AMP PRB: CPT

## 2020-10-11 PROCEDURE — 87088 URINE BACTERIA CULTURE: CPT

## 2020-10-11 PROCEDURE — 96366 THER/PROPH/DIAG IV INF ADDON: CPT

## 2020-10-11 PROCEDURE — 74011000258 HC RX REV CODE- 258: Performed by: EMERGENCY MEDICINE

## 2020-10-11 PROCEDURE — 96365 THER/PROPH/DIAG IV INF INIT: CPT

## 2020-10-11 RX ORDER — KETOROLAC TROMETHAMINE 30 MG/ML
30 INJECTION, SOLUTION INTRAMUSCULAR; INTRAVENOUS
Status: COMPLETED | OUTPATIENT
Start: 2020-10-11 | End: 2020-10-11

## 2020-10-11 RX ORDER — ONDANSETRON 8 MG/1
8 TABLET, ORALLY DISINTEGRATING ORAL
Qty: 12 TAB | Refills: 1 | Status: SHIPPED | OUTPATIENT
Start: 2020-10-11 | End: 2021-09-20 | Stop reason: ALTCHOICE

## 2020-10-11 RX ORDER — DEXAMETHASONE SODIUM PHOSPHATE 100 MG/10ML
10 INJECTION INTRAMUSCULAR; INTRAVENOUS ONCE
Status: COMPLETED | OUTPATIENT
Start: 2020-10-11 | End: 2020-10-11

## 2020-10-11 RX ORDER — ONDANSETRON 2 MG/ML
4 INJECTION INTRAMUSCULAR; INTRAVENOUS
Status: COMPLETED | OUTPATIENT
Start: 2020-10-11 | End: 2020-10-11

## 2020-10-11 RX ORDER — CEPHALEXIN 500 MG/1
500 CAPSULE ORAL 4 TIMES DAILY
Qty: 28 CAP | Refills: 0 | Status: SHIPPED | OUTPATIENT
Start: 2020-10-11 | End: 2020-10-21

## 2020-10-11 RX ADMIN — ONDANSETRON 4 MG: 2 INJECTION INTRAMUSCULAR; INTRAVENOUS at 15:49

## 2020-10-11 RX ADMIN — DEXAMETHASONE SODIUM PHOSPHATE 10 MG: 10 INJECTION INTRAMUSCULAR; INTRAVENOUS at 15:49

## 2020-10-11 RX ADMIN — KETOROLAC TROMETHAMINE 30 MG: 30 INJECTION, SOLUTION INTRAMUSCULAR; INTRAVENOUS at 15:49

## 2020-10-11 RX ADMIN — SODIUM CHLORIDE 1000 ML: 900 INJECTION, SOLUTION INTRAVENOUS at 15:49

## 2020-10-11 RX ADMIN — CEFTRIAXONE 1 G: 1 INJECTION, POWDER, FOR SOLUTION INTRAMUSCULAR; INTRAVENOUS at 15:48

## 2020-10-11 NOTE — ED NOTES
I have reviewed discharge instructions with the patient. The patient verbalized understanding. Patient left ED via Discharge Method: ambulatory to Home with family. Opportunity for questions and clarification provided. Patient given 2 scripts. To continue your aftercare when you leave the hospital, you may receive an automated call from our care team to check in on how you are doing. This is a free service and part of our promise to provide the best care and service to meet your aftercare needs.  If you have questions, or wish to unsubscribe from this service please call [phone]. Thank you for Choosing our [facility] Emergency Department.
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
## 2020-10-11 NOTE — DISCHARGE INSTRUCTIONS
Alternate 4 ibuprofen every 8 hours with 2 extra-strength tylenol every 6 hours  Encourage fluids  Take ALL TEN days of the antibiotic, for the urinary tract infection  Look up your COVID test results using the \"my chart\" feature which can be found in the back part of your discharge instructions  Practice social distancing, mask use, until your results return     return if worse, particularly for significant shortness of breath,  Consider trying to get a portable finger pulse ox probe from a local pharmacy, or from the Internet. Patient Education        Urinary Tract Infection in Women: Care Instructions  Your Care Instructions     A urinary tract infection, or UTI, is a general term for an infection anywhere between the kidneys and the urethra (where urine comes out). Most UTIs are bladder infections. They often cause pain or burning when you urinate. UTIs are caused by bacteria and can be cured with antibiotics. Be sure to complete your treatment so that the infection goes away. Follow-up care is a key part of your treatment and safety. Be sure to make and go to all appointments, and call your doctor if you are having problems. It's also a good idea to know your test results and keep a list of the medicines you take. How can you care for yourself at home? · Take your antibiotics as directed. Do not stop taking them just because you feel better. You need to take the full course of antibiotics. · Drink extra water and other fluids for the next day or two. This may help wash out the bacteria that are causing the infection. (If you have kidney, heart, or liver disease and have to limit fluids, talk with your doctor before you increase your fluid intake.)  · Avoid drinks that are carbonated or have caffeine. They can irritate the bladder. · Urinate often. Try to empty your bladder each time. · To relieve pain, take a hot bath or lay a heating pad set on low over your lower belly or genital area.  Never go to sleep with a heating pad in place. To prevent UTIs  · Drink plenty of water each day. This helps you urinate often, which clears bacteria from your system. (If you have kidney, heart, or liver disease and have to limit fluids, talk with your doctor before you increase your fluid intake.)  · Urinate when you need to. · Urinate right after you have sex. · Change sanitary pads often. · Avoid douches, bubble baths, feminine hygiene sprays, and other feminine hygiene products that have deodorants. · After going to the bathroom, wipe from front to back. When should you call for help? Call your doctor now or seek immediate medical care if:    · Symptoms such as fever, chills, nausea, or vomiting get worse or appear for the first time.     · You have new pain in your back just below your rib cage. This is called flank pain.     · There is new blood or pus in your urine.     · You have any problems with your antibiotic medicine. Watch closely for changes in your health, and be sure to contact your doctor if:    · You are not getting better after taking an antibiotic for 2 days.     · Your symptoms go away but then come back. Where can you learn more? Go to http://www.gray.com/  Enter W268 in the search box to learn more about \"Urinary Tract Infection in Women: Care Instructions. \"  Current as of: June 29, 2020               Content Version: 12.6  © 6018-1575 Healthwise, Incorporated. Care instructions adapted under license by [employer] (which disclaims liability or warranty for this information). If you have questions about a medical condition or this instruction, always ask your healthcare professional. [place] [address] any warranty or liability for your use of this information. Patient Education        Learning About Substance Use Disorder  What is substance use disorder?   Substance use disorder means that a person uses substances even though it causes harm to themselves or others. It can range from mild to severe. The more signs of this disorder you have, the more severe it may be. Moderate to severe substance use disorder is sometimes called addiction. People who have it find it hard to control their use. This disorder can develop from the use of almost any type of substance. This includes alcohol, illegal drugs, prescription medicines, and over-the-counter medicines. Could you have substance use disorder? If there's a chance you may have substance use disorder, it's important to find out. Ask yourself the following questions. You may have substance use disorder if your answer is \"yes\" to two or more of them. · Do you use larger amounts of the substance than you ever meant to? Or have you been using it for a longer time than you ever meant to? · Are you not able to cut down or control your use? Or do you constantly wish you could cut down? · Do you spend a lot of time getting or using the substance or recovering from the effects? · Do you have strong cravings for the substance? · Do you find that you can no longer do your main jobs at work, at school, or at home? · Do you keep using, even though your substance use hurts your relationships? · Have you stopped doing important activities because of your substance use? · Do you use substances in situations where doing so is dangerous? · Do you keep using the substance even though you know it's causing health problems? · Do you need more and more of the substance to get the same effect, or do you get less effect from the same amount over time? This is called tolerance. · Do you have uncomfortable symptoms (withdrawal) when you stop using the substance or use less? Substance use disorder can range from mild to severe. The more signs of this disorder you have, the more severe it may be. Do you think you might have substance use disorder?  If you do, then you've just taken an important first step.  Many people have overcome this problem. And most of them started by reaching out to others, like caring friends or family, their doctor, or a support group. How is substance use disorder treated? If you think you may have substance use disorder, talk to your doctor. You and your doctor can decide whether you have this disorder and what type of treatment might help you. If you are physically dependent on the substance, you may need to stay in a hospital at first. There you can be treated for withdrawal symptoms. One of the goals of treatment for substance use disorder is to help you get used to life without the substance. Counseling can help you prepare for people or situations that might tempt you to start using again. You can practice these skills through one-on-one counseling, family therapy, or group therapy. Therapy may be part of inpatient treatment, where you stay in a treatment center. Or it may be part of outpatient treatment, where you can fit your therapy around your job or other responsibilities. Another goal of treatment is to help you find ongoing support for your sober life. Many people find support by going to meetings like Alcoholics Anonymous, Narcotics Anonymous, or SMART Recovery. This type of support can help you feel less alone and more motivated to stay sober. You might talk to your doctor or do an online search for local treatment programs. Or you might tell a friend or loved one that you need help. Follow-up care is a key part of your treatment and safety. Be sure to make and go to all appointments, and call your doctor if you are having problems. It's also a good idea to know your test results and keep a list of the medicines you take. Where can you learn more? Go to http://www.gray.com/  Enter M849 in the search box to learn more about \"Learning About Substance Use Disorder. \"  Current as of: June 29, 2020               Content Version: 12.6  © 2506-1262 Healthwise, Incorporated. Care instructions adapted under license by [employer] (which disclaims liability or warranty for this information). If you have questions about a medical condition or this instruction, always ask your healthcare professional. [place] [address] any warranty or liability for your use of this information.

## 2020-10-11 NOTE — ED TRIAGE NOTES
Patient ambulatory to triage without difficulty; mask in place. Patient reports generalized body aches, chills, and cough since Wednesday. Denies treatment pta. Patient reports having a \"bad trip\" on Wednesday last week when she snorted the wrong thing. States she thought she was snorting ecstasy but it was heroin. Reports methamphetamine use as well. States she has not felt well since snorting this drug. States she would also like some help with her drug problem.

## 2020-10-12 ENCOUNTER — PATIENT OUTREACH (OUTPATIENT)
Dept: CASE MANAGEMENT | Age: 40
End: 2020-10-12

## 2020-10-12 LAB
AMPHET UR QL SCN: POSITIVE
BARBITURATES UR QL SCN: NEGATIVE
BENZODIAZ UR QL: NEGATIVE
CANNABINOIDS UR QL SCN: NEGATIVE
COCAINE UR QL SCN: NEGATIVE
METHADONE UR QL: NEGATIVE
OPIATES UR QL: NEGATIVE
PCP UR QL: NEGATIVE
SARS COV-2, XPGCVT: NEGATIVE
SOURCE, COVRS: NORMAL

## 2020-10-12 NOTE — PROGRESS NOTES
Patient states that she plans to follow-up with [facility]END[facility]. Has an appt. Scheduled for next Wed.

## 2020-10-15 LAB
BACTERIA SPEC CULT: ABNORMAL
BACTERIA SPEC CULT: ABNORMAL
SERVICE CMNT-IMP: ABNORMAL

## 2020-10-18 NOTE — PROGRESS NOTES
Patient was placed on Keflex at home, given Ceftriaxone at home and the urine is susceptible. Continue current medication.

## 2020-12-02 ENCOUNTER — HOSPITAL ENCOUNTER (OUTPATIENT)
Dept: PHYSICAL THERAPY | Age: 40
Discharge: HOME | End: 2020-12-02
Attending: FAMILY MEDICINE
Payer: MEDICARE

## 2020-12-02 DIAGNOSIS — M25.551 CHRONIC HIP PAIN, BILATERAL: ICD-10-CM

## 2020-12-02 DIAGNOSIS — M62.838 MUSCLE SPASM OF SHOULDER REGION: ICD-10-CM

## 2020-12-02 DIAGNOSIS — G89.29 CHRONIC HIP PAIN, BILATERAL: ICD-10-CM

## 2020-12-02 DIAGNOSIS — M25.552 CHRONIC HIP PAIN, BILATERAL: ICD-10-CM

## 2020-12-02 PROCEDURE — 97161 PT EVAL LOW COMPLEX 20 MIN: CPT

## 2020-12-02 NOTE — THERAPY EVALUATION
Darryle Rude  : 1980  Primary: [place]  [place] Of [facility] [place]  [facility], [address], [place] [address].  [phone]   [phone]          OUTPATIENT PHYSICAL THERAPY:Initial Assessment 2020   ICD-10: Treatment Diagnosis: Pain in right shoulder (M25.511); Stiffness of right shoulder (M25.61); Pain in right hip (M25.551)  Precautions/Allergies:   Lisinopril   TREATMENT PLAN:  Effective Dates: 2020 TO 2021. Frequency/Duration: 1-2 visits per week for 8 weeks MEDICAL/REFERRING DIAGNOSIS:  Muscle spasm of shoulder region [M62.838]  Chronic hip pain, bilateral [M25.551, M25.552, G89.29]   DATE OF ONSET: 3 years ago  REFERRING PHYSICIAN: Connie Cruz DO MD Orders: Evaluate and treat  Return MD Appointment: 2021     INITIAL ASSESSMENT:  Ms. Deng presents to PT with R shoulder and R hip pain of insidious onset. Patient demonstrates increased R shoulder pain, R upper trapezius tightness and tenderness, reduced R shoulder abduction ROM, and R hip abductor weakness and generalized R LE weakness. Patient is likely to receive some benefit from PT to address her symptoms and improve her quality of life. PROBLEM LIST (Impacting functional limitations):  1. Decreased Strength  2. Increased Pain  3. Decreased Activity Tolerance  4. Decreased Flexibility/Joint Mobility  5. Decreased Williams with Home Exercise Program INTERVENTIONS PLANNED: (Treatment may consist of any combination of the following)  1. Home Exercise Program (HEP)  2. Manual Therapy  3. Range of Motion (ROM)  4. Therapeutic Exercise/Strengthening  5. Ultrasound (US)  6. Dry needling     GOALS: (Goals have been discussed and agreed upon with patient.)  Short-Term Functional Goals: Time Frame: 4 weeks  1. Patient will improve her score on DASH by 12% (<68% limited) to demonstrate meaningful change.   2. Patient will improve score on her LEFS by 9 points to demonstrate meaningful change. 3. Patient will be able to achieve 100 degrees of active R shoulder abduction with minimal pain. 4. Patient will have 4+/5 or better R hip abductor strength. Discharge Goals: Time Frame: 8 weeks  1. Patient will improve score on DASH to <55% limited, and LEFS score to >30/80. 2. Patient will have 5/5 R hip abductor strength. 3. Patient will be able to achieve 130 degrees of R shoulder abduction actively. 4. Patient will be able to get dressed without significant limitation from shoulder pain. 5. Patient will be independent with HEP. OUTCOME MEASURE:   Tool Used: Disabilities of the Arm, Shoulder and Hand (DASH) Questionnaire - Quick Version  Score:  Initial: 46/55 or 80% limited  Most Recent: X/55 (Date: -- )   Interpretation of Score: The DASH is designed to measure the activities of daily living in person's with upper extremity dysfunction or pain. Each section is scored on a 1-5 scale, 5 representing the greatest disability. The scores of each section are added together for a total score of 55. Tool Used: Lower Extremity Functional Scale (LEFS)  Score:  Initial: 9/80 Most Recent: X/80 (Date: -- )   Interpretation of Score: 20 questions each scored on a 5 point scale with 0 representing \"extreme difficulty or unable to perform\" and 4 representing \"no difficulty\". The lower the score, the greater the functional disability. 80/80 represents no disability. Minimal detectable change is 9 points. Score 80 79-63 62-48 47-32 31-16 15-1 0   Modifier CH CI CJ CK CL CM CN       MEDICAL NECESSITY:   · Skilled intervention continues to be required due to R shoulder and R hip pain which impairs her daily functioning. .  REASON FOR SERVICES/OTHER COMMENTS:  · Patient continues to require skilled intervention due to R shoulder and R hip pain which impairs her functional activity participation. .  Total Duration:       Rehabilitation Potential For Stated Goals: [address] RAMSEY Romero's therapy, I certify that the treatment plan above will be carried out by a therapist or under their direction. Thank you for this referral,  Marylou Cool, PT     Referring Physician Signature: Obinna Meka, DO _______________________________ Date _____________     PAIN/SUBJECTIVE:   Initial:   8/10 (shoulder), R hip not quantified but states that it is sharp Post Session:  No VAS   HISTORY:   History of Injury/Illness (Reason for Referral):  Patient reports that about 3 years ago she developed R shoulder and R hip pain of insidious onset. Has pain with lifting a grocery bag and reaching overhead, and has occasional neck pain. Diagnosed with carpal tunnel in R hand and received an injection earlier in the year, which helped. Has difficulty with sleeping due to shoulder pain, and she is unable to reach behind her back due to pain. Getting dressed requires extra time, but she can do it without assistance. Prolonged standing increases hip pain, as does walking and \"sitting wrong\". Has possible fibromyalgia and neuropathy in B LEs. Past Medical History/Comorbidities: per EMR  Ms. Deng  has a past medical history of Carpal tunnel syndrome on right (8/19/2019), Diabetes ([facility] [address] Dry mouth, Dyspnea (05/2013), GERD (gastroesophageal reflux disease), Herpes simplex type 2 infection (06/19/2019), History of multiple allergies, Hypercholesterolemia, Hypertension (06/11/2013), Kidney stone, SEBAS (obstructive sleep apnea) (08/2019), Polysubstance abuse ([address] Psychiatric disorder (1997), and Sinus problem. She also has no past medical history of Asthma. Ms. Deng  has a past surgical history that includes hx urological (5/29/13). Social History/Living Environment:    Lives with her . Prior Level of Function/Work/Activity:  Patient does not work due to disability.       Ambulatory/Rehab Services H2 Model Falls Risk Assessment   Risk Factors:       No Risk Factors Identified Ability to Rise from Chair:       (0)  Ability to rise in a single movement   Falls Prevention Plan:       No modifications necessary   Total: (5 or greater = High Risk): 0   ©2010 [facility] of [employer]. All Rights Reserved. [facility] Patent #[id number]. Federal Law prohibits the replication, distribution or use without written permission from [facility] [place]   Current Medications: per patient intake form    - Gabapentin  - Albuterol  - Atorvastatin  - fluticasone propionate  - Losartan  - Ozempic  - triamterene  - metformin  - meloxicam  - Valaciclovir  - simplex   - hydroxyzine pamoate  - Abilify   Date Last Reviewed:  12/3/2020   Number of Personal Factors/Comorbidities that affect the Plan of Care: 1-2: MODERATE COMPLEXITY   EXAMINATION:   12/2/2020    Observation/Orthostatic Postural Assessment:          Patient ambulates without antalgic gait on level surfaces and is able to ascend/descend stairs without use of handrails. Able to utilize reciprocal gait pattern to ascend and descend stairs. Palpation:          Tender and tight to R upper trapezius, and R deltoid. Tender to R gluteals and R lateral thigh. Audible pop with internal rotation passively at 60 degrees of abduction in scapular plane, with possible instability of R shoulder.    ROM:       LUE AROM  L Shoulder Flexion: 145  L Shoulder ABduction: 145  L Shoulder Internal Rotation: (with elbow at side; L4-5 reaching behind back)  L Shoulder External Rotation: 63(Elbow at side)      L LE PROM  L hip Internal Rotation: 20 degrees (seated)  L external rotation: 45 degrees (seated)    RUE AROM  R Shoulder Flexion: 123  R Shoulder ABduction: 65  R Shoulder Internal Rotation: (lateral R thigh)  R Shoulder External Rotation: 55(elbow at side)  RLE PROM  R Hip Internal Rotation: 20(Seated)  R Hip External Rotation: 45(Seated)                Strength:             Hip flexion 4+/5 B           Hip abduction: 3+/5 R           Knee extension: 5/5 B           Knee flexion: L 4+/5 ,R 5/5           Ankle DF 4+/5 B  Special Tests:             Negative cervical distraction, negative MAYANK bilateral LE, negative FADIR R (not tested on R)     Body Structures Involved:  1. Nerves  2. Bones  3. Joints  4. Muscles Body Functions Affected:  1. Sensory/Pain  2. Neuromusculoskeletal  3. Movement Related Activities and Participation Affected:  1. General Tasks and Demands  2. Mobility  3.  Self Care   Number of elements (examined above) that affect the Plan of Care: 4+: HIGH COMPLEXITY   CLINICAL PRESENTATION:   Presentation: Stable and uncomplicated: LOW COMPLEXITY   CLINICAL DECISION MAKING:   Use of outcome tool(s) and clinical judgement create a POC that gives a: Clear prediction of patient's progress: LOW COMPLEXITY

## 2021-03-19 NOTE — PROGRESS NOTES
Crystal Jimenez  : 1980  [place]: [place]  Secondary: [place] [facility] [place], [address], [place] [address].  [phone]   [phone]          OUTPATIENT PHYSICAL THERAPY:Discontinuation Summary 2020   ICD-10: Treatment Diagnosis: Pain in right shoulder (M25.511); Stiffness of right shoulder (M25.61); Pain in right hip (M25.551)  Precautions/Allergies:   Lisinopril    MEDICAL/REFERRING DIAGNOSIS:  Muscle spasm of shoulder region [M62.838]  Chronic hip pain, bilateral [M25.551, M25.552, G89.29]   DATE OF ONSET: 3 years ago  REFERRING PHYSICIAN: Josefina Crane DO MD Orders: Evaluate and treat   Crystal Jimenez has been seen in physical therapy 21 for 1 visits. Treatment has been discontinued at this time due to patient failing to return for additional treatment. The below goals were met not prior to discontinuation. Thank you for this referral.            GOALS: (Goals have been discussed and agreed upon with patient.)  Short-Term Functional Goals:   1. Patient will improve her score on DASH by 12% (<68% limited) to demonstrate meaningful change. 2. Patient will improve score on her LEFS by 9 points to demonstrate meaningful change. 3. Patient will be able to achieve 100 degrees of active R shoulder abduction with minimal pain. 4. Patient will have 4+/5 or better R hip abductor strength. Discharge Goals:   1. Patient will improve score on DASH to <55% limited, and LEFS score to >30/80. 2. Patient will have 5/5 R hip abductor strength. 3. Patient will be able to achieve 130 degrees of R shoulder abduction actively. 4. Patient will be able to get dressed without significant limitation from shoulder pain. 5. Patient will be independent with HEP.      OUTCOME MEASURE:   Tool Used: Disabilities of the Arm, Shoulder and Hand (DASH) Questionnaire - Quick Version  Score:  Initial: 46/55 or 80% limited  Most Recent: X/55 (Date: -- )   Interpretation of Score: The DASH is designed to measure the activities of daily living in person's with upper extremity dysfunction or pain. Each section is scored on a 1-5 scale, 5 representing the greatest disability. The scores of each section are added together for a total score of 55. Tool Used: Lower Extremity Functional Scale (LEFS)  Score:  Initial: 9/80 Most Recent: X/80 (Date: -- )   Interpretation of Score: 20 questions each scored on a 5 point scale with 0 representing \"extreme difficulty or unable to perform\" and 4 representing \"no difficulty\". The lower the score, the greater the functional disability. 80/80 represents no disability. Minimal detectable change is 9 points.   Score 80 79-63 62-48 47-32 31-16 15-1 0   Modifier CH CI CJ CK CL CM CN

## 2021-08-03 ENCOUNTER — HOSPITAL ENCOUNTER (EMERGENCY)
Age: 41
Discharge: HOME | End: 2021-08-03
Attending: EMERGENCY MEDICINE
Payer: MEDICARE

## 2021-08-03 ENCOUNTER — APPOINTMENT (OUTPATIENT)
Dept: GENERAL RADIOLOGY | Age: 41
End: 2021-08-03
Attending: EMERGENCY MEDICINE
Payer: MEDICARE

## 2021-08-03 ENCOUNTER — APPOINTMENT (OUTPATIENT)
Dept: CT IMAGING | Age: 41
End: 2021-08-03
Attending: EMERGENCY MEDICINE
Payer: MEDICARE

## 2021-08-03 VITALS
HEIGHT: 65 IN | HEART RATE: 89 BPM | WEIGHT: 211 LBS | DIASTOLIC BLOOD PRESSURE: 117 MMHG | OXYGEN SATURATION: 98 % | TEMPERATURE: 97.1 F | SYSTOLIC BLOOD PRESSURE: 164 MMHG | BODY MASS INDEX: 35.16 KG/M2 | RESPIRATION RATE: 22 BRPM

## 2021-08-03 DIAGNOSIS — I21.4 NSTEMI (NON-ST ELEVATED MYOCARDIAL INFARCTION) (HCC): Primary | ICD-10-CM

## 2021-08-03 DIAGNOSIS — J12.9 VIRAL PNEUMONIA: ICD-10-CM

## 2021-08-03 LAB
ALBUMIN SERPL-MCNC: 3.5 G/DL (ref 3.5–5)
ALBUMIN/GLOB SERPL: 0.9 {RATIO} (ref 1.2–3.5)
ALP SERPL-CCNC: 70 U/L (ref 50–136)
ALT SERPL-CCNC: 29 U/L (ref 12–65)
ANION GAP SERPL CALC-SCNC: 6 MMOL/L (ref 7–16)
AST SERPL-CCNC: 16 U/L (ref 15–37)
ATRIAL RATE: 110 BPM
BASOPHILS # BLD: 0 K/UL (ref 0–0.2)
BASOPHILS NFR BLD: 0 % (ref 0–2)
BILIRUB SERPL-MCNC: 0.2 MG/DL (ref 0.2–1.1)
BNP SERPL-MCNC: 349 PG/ML (ref 5–125)
BUN SERPL-MCNC: 13 MG/DL (ref 6–23)
CALCIUM SERPL-MCNC: 8.9 MG/DL (ref 8.3–10.4)
CALCULATED P AXIS, ECG09: 62 DEGREES
CALCULATED R AXIS, ECG10: -13 DEGREES
CALCULATED T AXIS, ECG11: 60 DEGREES
CHLORIDE SERPL-SCNC: 104 MMOL/L (ref 98–107)
CO2 SERPL-SCNC: 29 MMOL/L (ref 21–32)
COVID-19 RAPID TEST, COVR: NOT DETECTED
CREAT SERPL-MCNC: 0.83 MG/DL (ref 0.6–1)
DIAGNOSIS, 93000: NORMAL
DIFFERENTIAL METHOD BLD: ABNORMAL
EOSINOPHIL # BLD: 0 K/UL (ref 0–0.8)
EOSINOPHIL NFR BLD: 0 % (ref 0.5–7.8)
ERYTHROCYTE [DISTWIDTH] IN BLOOD BY AUTOMATED COUNT: 15.1 % (ref 11.9–14.6)
GLOBULIN SER CALC-MCNC: 3.9 G/DL (ref 2.3–3.5)
GLUCOSE SERPL-MCNC: 136 MG/DL (ref 65–100)
HCT VFR BLD AUTO: 45.2 % (ref 35.8–46.3)
HGB BLD-MCNC: 14.2 G/DL (ref 11.7–15.4)
IMM GRANULOCYTES # BLD AUTO: 0 K/UL (ref 0–0.5)
IMM GRANULOCYTES NFR BLD AUTO: 0 % (ref 0–5)
LIPASE SERPL-CCNC: 104 U/L (ref 73–393)
LYMPHOCYTES # BLD: 3.1 K/UL (ref 0.5–4.6)
LYMPHOCYTES NFR BLD: 32 % (ref 13–44)
MAGNESIUM SERPL-MCNC: 2 MG/DL (ref 1.8–2.4)
MCH RBC QN AUTO: 26.7 PG (ref 26.1–32.9)
MCHC RBC AUTO-ENTMCNC: 31.4 G/DL (ref 31.4–35)
MCV RBC AUTO: 85 FL (ref 79.6–97.8)
MONOCYTES # BLD: 0.3 K/UL (ref 0.1–1.3)
MONOCYTES NFR BLD: 4 % (ref 4–12)
NEUTS SEG # BLD: 6.1 K/UL (ref 1.7–8.2)
NEUTS SEG NFR BLD: 63 % (ref 43–78)
NRBC # BLD: 0 K/UL (ref 0–0.2)
P-R INTERVAL, ECG05: 142 MS
PLATELET # BLD AUTO: 312 K/UL (ref 150–450)
PMV BLD AUTO: 9.7 FL (ref 9.4–12.3)
POTASSIUM SERPL-SCNC: 3.9 MMOL/L (ref 3.5–5.1)
PROT SERPL-MCNC: 7.4 G/DL (ref 6.3–8.2)
Q-T INTERVAL, ECG07: 344 MS
QRS DURATION, ECG06: 66 MS
QTC CALCULATION (BEZET), ECG08: 465 MS
RBC # BLD AUTO: 5.32 M/UL (ref 4.05–5.2)
SARS-COV-2, COV2: NORMAL
SODIUM SERPL-SCNC: 139 MMOL/L (ref 136–145)
SOURCE, COVRS: NORMAL
TROPONIN-HIGH SENSITIVITY: 178.3 PG/ML (ref 0–14)
TROPONIN-HIGH SENSITIVITY: 178.8 PG/ML (ref 0–14)
VENTRICULAR RATE, ECG03: 110 BPM
WBC # BLD AUTO: 9.7 K/UL (ref 4.3–11.1)

## 2021-08-03 PROCEDURE — 93005 ELECTROCARDIOGRAM TRACING: CPT | Performed by: EMERGENCY MEDICINE

## 2021-08-03 PROCEDURE — 83880 ASSAY OF NATRIURETIC PEPTIDE: CPT

## 2021-08-03 PROCEDURE — 84484 ASSAY OF TROPONIN QUANT: CPT

## 2021-08-03 PROCEDURE — 74011000258 HC RX REV CODE- 258: Performed by: EMERGENCY MEDICINE

## 2021-08-03 PROCEDURE — 80053 COMPREHEN METABOLIC PANEL: CPT

## 2021-08-03 PROCEDURE — 74011000636 HC RX REV CODE- 636: Performed by: EMERGENCY MEDICINE

## 2021-08-03 PROCEDURE — 87635 SARS-COV-2 COVID-19 AMP PRB: CPT

## 2021-08-03 PROCEDURE — 83690 ASSAY OF LIPASE: CPT

## 2021-08-03 PROCEDURE — U0005 INFEC AGEN DETEC AMPLI PROBE: HCPCS

## 2021-08-03 PROCEDURE — 85025 COMPLETE CBC W/AUTO DIFF WBC: CPT

## 2021-08-03 PROCEDURE — 71046 X-RAY EXAM CHEST 2 VIEWS: CPT

## 2021-08-03 PROCEDURE — 99284 EMERGENCY DEPT VISIT MOD MDM: CPT

## 2021-08-03 PROCEDURE — 83735 ASSAY OF MAGNESIUM: CPT

## 2021-08-03 PROCEDURE — 71260 CT THORAX DX C+: CPT

## 2021-08-03 RX ORDER — LORAZEPAM 2 MG/ML
0.5 INJECTION INTRAMUSCULAR
Status: DISCONTINUED | OUTPATIENT
Start: 2021-08-03 | End: 2021-08-03

## 2021-08-03 RX ORDER — SODIUM CHLORIDE 0.9 % (FLUSH) 0.9 %
10 SYRINGE (ML) INJECTION
Status: COMPLETED | OUTPATIENT
Start: 2021-08-03 | End: 2021-08-03

## 2021-08-03 RX ORDER — SODIUM CHLORIDE 0.9 % (FLUSH) 0.9 %
5-10 SYRINGE (ML) INJECTION EVERY 8 HOURS
Status: DISCONTINUED | OUTPATIENT
Start: 2021-08-03 | End: 2021-08-03 | Stop reason: HOSPADM

## 2021-08-03 RX ORDER — SODIUM CHLORIDE 0.9 % (FLUSH) 0.9 %
5-10 SYRINGE (ML) INJECTION AS NEEDED
Status: DISCONTINUED | OUTPATIENT
Start: 2021-08-03 | End: 2021-08-03 | Stop reason: HOSPADM

## 2021-08-03 RX ADMIN — IOPAMIDOL 100 ML: 755 INJECTION, SOLUTION INTRAVENOUS at 10:57

## 2021-08-03 RX ADMIN — Medication 10 ML: at 10:57

## 2021-08-03 RX ADMIN — SODIUM CHLORIDE 100 ML: 900 INJECTION, SOLUTION INTRAVENOUS at 10:57

## 2021-08-03 NOTE — ED TRIAGE NOTES
Pt arrives via POV c/o left sided chest discomfort and SHOB that started three weeks ago. Was seen at [facility] in [place] and was told she has a blood clot (unknown where) and a possible MI. Pt reports increased SHOB today with exertion. Denies cardiac hx. Reports hx of COPD.

## 2021-08-03 NOTE — ED NOTES
I have reviewed discharge instructions with the patient. The patient verbalized understanding. Patient left ED via Discharge Method: ambulatory to Home with self. Opportunity for questions and clarification provided. Pt left against medical advice, patient verbalized understanding of the risks. AMA form completed and signed by physician, patient, and witness. Patient given 0 scripts. To continue your aftercare when you leave the hospital, you may receive an automated call from our care team to check in on how you are doing. This is a free service and part of our promise to provide the best care and service to meet your aftercare needs.  If you have questions, or wish to unsubscribe from this service please call [phone]. Thank you for Choosing our [employer] Emergency Department.
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
## 2021-08-03 NOTE — DISCHARGE INSTRUCTIONS
You need to see a cardiologist sooner rather than later or return to the emergency department if you change your mind and decide that you want us to investigate the concerning findings for possible cardiac ischemia. Advance Care Planning  People with COVID-19 may have no symptoms, mild symptoms, such as fever, cough, and shortness of breath or they may have more severe illness, developing severe and fatal pneumonia. As a result, Advance Care Planning with attention to naming a health care decision maker (someone you trust to make healthcare decisions for you if you could not speak for yourself) and sharing other health care preferences is important BEFORE a possible health crisis. Please contact your Primary Care Provider to discuss Advance Care Planning. Preventing the Spread of Coronavirus Disease 2019 in Homes and Residential Communities  For the most recent information go to [email or web address]    Prevention steps for People with confirmed or suspected COVID-19 (including persons under investigation) who do not need to be hospitalized  and   People with confirmed COVID-19 who were hospitalized and determined to be medically stable to go home    Your healthcare provider and public health staff will evaluate whether you can be cared for at home. If it is determined that you do not need to be hospitalized and can be isolated at home, you will be monitored by staff from your local or state health department. You should follow the prevention steps below until a healthcare provider or local or state health department says you can return to your normal activities. Stay home except to get medical care  People who are mildly ill with COVID-19 are able to isolate at home during their illness. You should restrict activities outside your home, except for getting medical care. Do not go to work, school, or public areas.  Avoid using public transportation, ride-sharing, or taxis. Separate yourself from other people and animals in your home  People: As much as possible, you should stay in a specific room and away from other people in your home. Also, you should use a separate bathroom, if available. Animals: You should restrict contact with pets and other animals while you are sick with COVID-19, just like you would around other people. Although there have not been reports of pets or other animals becoming sick with COVID-19, it is still recommended that people sick with COVID-19 limit contact with animals until more information is known about the virus. When possible, have another member of your household care for your animals while you are sick. If you are sick with COVID-19, avoid contact with your pet, including petting, snuggling, being kissed or licked, and sharing food. If you must care for your pet or be around animals while you are sick, wash your hands before and after you interact with pets and wear a facemask. Call ahead before visiting your doctor  If you have a medical appointment, call the healthcare provider and tell them that you have or may have COVID-19. This will help the healthcare providers office take steps to keep other people from getting infected or exposed. Wear a facemask  You should wear a facemask when you are around other people (e.g., sharing a room or vehicle) or pets and before you enter a healthcare providers office. If you are not able to wear a facemask (for example, because it causes trouble breathing), then people who live with you should not stay in the same room with you, or they should wear a facemask if they enter your room. Cover your coughs and sneezes  Cover your mouth and nose with a tissue when you cough or sneeze. Throw used tissues in a lined trash can.  Immediately wash your hands with soap and water for at least 20 seconds or, if soap and water are not available, clean your hands with an alcohol-based hand  that contains at least 60% alcohol. Clean your hands often  Wash your hands often with soap and water for at least 20 seconds, especially after blowing your nose, coughing, or sneezing; going to the bathroom; and before eating or preparing food. If soap and water are not readily available, use an alcohol-based hand  with at least 60% alcohol, covering all surfaces of your hands and rubbing them together until they feel dry. Soap and water are the best option if hands are visibly dirty. Avoid touching your eyes, nose, and mouth with unwashed hands. Avoid sharing personal household items  You should not share dishes, drinking glasses, cups, eating utensils, towels, or bedding with other people or pets in your home. After using these items, they should be washed thoroughly with soap and water. Clean all high-touch surfaces everyday  High touch surfaces include counters, tabletops, doorknobs, bathroom fixtures, toilets, phones, keyboards, tablets, and bedside tables. Also, clean any surfaces that may have blood, stool, or body fluids on them. Use a household cleaning spray or wipe, according to the label instructions. Labels contain instructions for safe and effective use of the cleaning product including precautions you should take when applying the product, such as wearing gloves and making sure you have good ventilation during use of the product. Monitor your symptoms  Seek prompt medical attention if your illness is worsening (e.g., difficulty breathing). Before seeking care, call your healthcare provider and tell them that you have, or are being evaluated for, COVID-19. Put on a facemask before you enter the facility. These steps will help the healthcare providers office to keep other people in the office or waiting room from getting infected or exposed. Ask your healthcare provider to call the local or state health department.  Persons who are placed under active monitoring or facilitated self-monitoring should follow instructions provided by their local health department or occupational health professionals, as appropriate. When working with your local health department check their available hours. If you have a medical emergency and need to call 911, notify the dispatch personnel that you have, or are being evaluated for COVID-19. If possible, put on a facemask before emergency medical services arrive. Discontinuing home isolation  Patients with confirmed COVID-19 should remain under home isolation precautions until the risk of secondary transmission to others is thought to be low. The decision to discontinue home isolation precautions should be made on a case-by-case basis, in consultation with healthcare providers and state and local health departments.

## 2021-08-04 LAB
SARS-COV-2, COV2: NOT DETECTED
SPECIMEN SOURCE, FCOV2M: NORMAL

## 2021-09-10 ENCOUNTER — HOSPITAL ENCOUNTER (OUTPATIENT)
Dept: CT IMAGING | Age: 41
Discharge: HOME | End: 2021-09-10
Attending: FAMILY MEDICINE

## 2021-09-10 DIAGNOSIS — R91.1 PULMONARY NODULE, RIGHT: ICD-10-CM

## 2021-09-10 RX ORDER — SODIUM CHLORIDE 0.9 % (FLUSH) 0.9 %
10 SYRINGE (ML) INJECTION
Status: COMPLETED | OUTPATIENT
Start: 2021-09-10 | End: 2021-09-10

## 2021-09-10 RX ADMIN — Medication 10 ML: at 11:43

## 2021-09-14 PROBLEM — F15.10 METHAMPHETAMINE USE (HCC): Status: ACTIVE | Noted: 2021-09-14

## 2022-03-18 PROBLEM — E11.9 TYPE II DIABETES MELLITUS (HCC): Status: ACTIVE | Noted: 2019-05-13

## 2022-03-18 PROBLEM — E11.40 TYPE 2 DIABETES MELLITUS WITH DIABETIC NEUROPATHY (HCC): Status: ACTIVE | Noted: 2019-06-25

## 2022-03-18 PROBLEM — F15.10 METHAMPHETAMINE USE (HCC): Status: ACTIVE | Noted: 2021-09-14

## 2022-03-19 PROBLEM — N92.1 MENORRHAGIA WITH IRREGULAR CYCLE: Status: ACTIVE | Noted: 2019-08-13

## 2022-03-19 PROBLEM — F25.0 SCHIZOAFFECTIVE DISORDER, BIPOLAR TYPE (HCC): Status: ACTIVE | Noted: 2019-05-13

## 2022-03-19 PROBLEM — J30.9 ALLERGIC RHINITIS: Status: ACTIVE | Noted: 2019-05-13

## 2022-03-19 PROBLEM — E66.01 SEVERE OBESITY (HCC): Status: ACTIVE | Noted: 2019-05-13

## 2022-03-19 PROBLEM — G62.9 NEUROPATHY: Status: ACTIVE | Noted: 2019-05-13

## 2022-03-19 PROBLEM — E78.5 DYSLIPIDEMIA: Status: ACTIVE | Noted: 2019-05-13

## 2022-03-19 PROBLEM — G56.01 CARPAL TUNNEL SYNDROME ON RIGHT: Status: ACTIVE | Noted: 2019-08-19

## 2022-03-19 PROBLEM — F39 MOOD DISORDER (HCC): Status: ACTIVE | Noted: 2019-05-13

## 2022-08-27 ENCOUNTER — HOSPITAL ENCOUNTER (EMERGENCY)
Age: 42
Discharge: HOME | End: 2022-08-27
Attending: EMERGENCY MEDICINE
Payer: MEDICARE

## 2022-08-27 ENCOUNTER — HOSPITAL ENCOUNTER (EMERGENCY)
Dept: GENERAL RADIOLOGY | Age: 42
End: 2022-08-27
Payer: MEDICARE

## 2022-08-27 VITALS
TEMPERATURE: 98.6 F | RESPIRATION RATE: 18 BRPM | OXYGEN SATURATION: 99 % | SYSTOLIC BLOOD PRESSURE: 138 MMHG | HEART RATE: 78 BPM | DIASTOLIC BLOOD PRESSURE: 80 MMHG

## 2022-08-27 DIAGNOSIS — Z53.21 ELOPED FROM EMERGENCY DEPARTMENT: Primary | ICD-10-CM

## 2022-08-27 PROCEDURE — 99281 EMR DPT VST MAYX REQ PHY/QHP: CPT

## 2022-08-27 ASSESSMENT — PAIN SCALES - GENERAL: PAINLEVEL_OUTOF10: 0

## 2022-08-27 ASSESSMENT — PAIN - FUNCTIONAL ASSESSMENT: PAIN_FUNCTIONAL_ASSESSMENT: 0-10
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
## 2022-08-27 NOTE — ED TRIAGE NOTES
Pt ambulatory to triage. Pt states she is positive for Covid, reports her symptoms started 8/15. Pt states she was offered the antibody oral pill but she did not accept it, however now she is feeling more tired and has a lot of mucus with her cough. Pt states she came to the ER today to see if there was anything else she could do for the Covid.  Pt denies chest pain, shob, abd pain, n/v/d.

## 2022-08-29 ENCOUNTER — TELEMEDICINE (OUTPATIENT)
Dept: FAMILY MEDICINE CLINIC | Facility: CLINIC | Age: 42
End: 2022-08-29
Payer: COMMERCIAL

## 2022-08-29 DIAGNOSIS — E11.40 TYPE 2 DIABETES MELLITUS WITH DIABETIC NEUROPATHY, WITHOUT LONG-TERM CURRENT USE OF INSULIN (HCC): ICD-10-CM

## 2022-08-29 DIAGNOSIS — F15.10 OTHER STIMULANT ABUSE, UNCOMPLICATED (HCC): ICD-10-CM

## 2022-08-29 DIAGNOSIS — F25.0 SCHIZOAFFECTIVE DISORDER, BIPOLAR TYPE (HCC): ICD-10-CM

## 2022-08-29 DIAGNOSIS — K21.9 GASTROESOPHAGEAL REFLUX DISEASE, UNSPECIFIED WHETHER ESOPHAGITIS PRESENT: ICD-10-CM

## 2022-08-29 DIAGNOSIS — I10 PRIMARY HYPERTENSION: Primary | ICD-10-CM

## 2022-08-29 DIAGNOSIS — E78.5 DYSLIPIDEMIA: ICD-10-CM

## 2022-08-29 PROCEDURE — 99214 OFFICE O/P EST MOD 30 MIN: CPT | Performed by: NURSE PRACTITIONER

## 2022-08-29 RX ORDER — SEMAGLUTIDE 1.34 MG/ML
0.25 INJECTION, SOLUTION SUBCUTANEOUS WEEKLY
COMMUNITY
Start: 2022-04-04 | End: 2022-08-29 | Stop reason: SDUPTHER

## 2022-08-29 RX ORDER — OMEPRAZOLE 20 MG/1
20 CAPSULE, DELAYED RELEASE ORAL DAILY
Qty: 90 CAPSULE | Refills: 1 | Status: SHIPPED | OUTPATIENT
Start: 2022-08-29

## 2022-08-29 RX ORDER — LOSARTAN POTASSIUM 25 MG/1
25 TABLET ORAL DAILY
Qty: 90 TABLET | Refills: 1 | Status: SHIPPED | OUTPATIENT
Start: 2022-08-29

## 2022-08-29 RX ORDER — ATORVASTATIN CALCIUM 80 MG/1
80 TABLET, FILM COATED ORAL NIGHTLY
Qty: 90 TABLET | Refills: 1 | Status: SHIPPED | OUTPATIENT
Start: 2022-08-29

## 2022-08-29 RX ORDER — TRAZODONE HYDROCHLORIDE 50 MG/1
50 TABLET ORAL NIGHTLY
COMMUNITY
Start: 2022-08-22

## 2022-08-29 RX ORDER — TRIAMTERENE AND HYDROCHLOROTHIAZIDE 37.5; 25 MG/1; MG/1
1 CAPSULE ORAL DAILY
Qty: 90 CAPSULE | Refills: 1 | Status: SHIPPED | OUTPATIENT
Start: 2022-08-29

## 2022-08-29 RX ORDER — BUSPIRONE HYDROCHLORIDE 10 MG/1
10 TABLET ORAL 3 TIMES DAILY
COMMUNITY
Start: 2022-08-22

## 2022-08-29 RX ORDER — SEMAGLUTIDE 1.34 MG/ML
0.25 INJECTION, SOLUTION SUBCUTANEOUS WEEKLY
Qty: 4 PEN | Refills: 5 | Status: SHIPPED | OUTPATIENT
Start: 2022-08-29

## 2022-08-29 RX ORDER — ARIPIPRAZOLE 400 MG
400 KIT INTRAMUSCULAR
COMMUNITY
Start: 2022-08-16

## 2022-08-29 SDOH — ECONOMIC STABILITY: FOOD INSECURITY: WITHIN THE PAST 12 MONTHS, YOU WORRIED THAT YOUR FOOD WOULD RUN OUT BEFORE YOU GOT MONEY TO BUY MORE.: NEVER TRUE

## 2022-08-29 SDOH — ECONOMIC STABILITY: FOOD INSECURITY: WITHIN THE PAST 12 MONTHS, THE FOOD YOU BOUGHT JUST DIDN'T LAST AND YOU DIDN'T HAVE MONEY TO GET MORE.: NEVER TRUE

## 2022-08-29 ASSESSMENT — PATIENT HEALTH QUESTIONNAIRE - PHQ9
10. IF YOU CHECKED OFF ANY PROBLEMS, HOW DIFFICULT HAVE THESE PROBLEMS MADE IT FOR YOU TO DO YOUR WORK, TAKE CARE OF THINGS AT HOME, OR GET ALONG WITH OTHER PEOPLE: 0
SUM OF ALL RESPONSES TO PHQ9 QUESTIONS 1 & 2: 0
SUM OF ALL RESPONSES TO PHQ QUESTIONS 1-9: 0
9. THOUGHTS THAT YOU WOULD BE BETTER OFF DEAD, OR OF HURTING YOURSELF: 0
1. LITTLE INTEREST OR PLEASURE IN DOING THINGS: 0
2. FEELING DOWN, DEPRESSED OR HOPELESS: 0
8. MOVING OR SPEAKING SO SLOWLY THAT OTHER PEOPLE COULD HAVE NOTICED. OR THE OPPOSITE, BEING SO FIGETY OR RESTLESS THAT YOU HAVE BEEN MOVING AROUND A LOT MORE THAN USUAL: 0
4. FEELING TIRED OR HAVING LITTLE ENERGY: 0
5. POOR APPETITE OR OVEREATING: 0
SUM OF ALL RESPONSES TO PHQ QUESTIONS 1-9: 0
7. TROUBLE CONCENTRATING ON THINGS, SUCH AS READING THE NEWSPAPER OR WATCHING TELEVISION: 0
SUM OF ALL RESPONSES TO PHQ QUESTIONS 1-9: 0
SUM OF ALL RESPONSES TO PHQ QUESTIONS 1-9: 0
6. FEELING BAD ABOUT YOURSELF - OR THAT YOU ARE A FAILURE OR HAVE LET YOURSELF OR YOUR FAMILY DOWN: 0
3. TROUBLE FALLING OR STAYING ASLEEP: 0

## 2022-08-29 ASSESSMENT — SOCIAL DETERMINANTS OF HEALTH (SDOH): HOW HARD IS IT FOR YOU TO PAY FOR THE VERY BASICS LIKE FOOD, HOUSING, MEDICAL CARE, AND HEATING?: NOT HARD AT ALL

## 2022-08-29 NOTE — PROGRESS NOTES
Gerardo Luna (:  1980) is a Established patient, here for evaluation of the following:    Assessment & Plan   Below is the assessment and plan developed based on review of pertinent history, physical exam, labs, studies, and medications. 1. Primary hypertension  -     losartan (COZAAR) 25 MG tablet; Take 1 tablet by mouth daily, Disp-90 tablet, R-1Normal  -     triamterene-hydroCHLOROthiazide (DYAZIDE) 37.5-25 MG per capsule; Take 1 capsule by mouth daily, Disp-90 capsule, R-1Normal  2. Type 2 diabetes mellitus with diabetic neuropathy, without long-term current use of insulin (HCC)  -     Semaglutide,0.25 or 0.5MG/DOS, (OZEMPIC, 0.25 OR 0.5 MG/DOSE,) 2 MG/1.5ML SOPN; Inject 0.25 mg into the skin once a week, Disp-4 pen, R-5Normal  -     Comprehensive Metabolic Panel; Future  -     Hemoglobin A1C; Future  3. Dyslipidemia  -     atorvastatin (LIPITOR) 80 MG tablet; Take 1 tablet by mouth at bedtime, Disp-90 tablet, R-1Normal  -     Lipid Panel; Future  4. Gastroesophageal reflux disease, unspecified whether esophagitis present  -     omeprazole (PRILOSEC) 20 MG delayed release capsule; Take 1 capsule by mouth daily, Disp-90 capsule, R-1Normal  5. Schizoaffective disorder, bipolar type ([facility] [address] [facility]  6. Other stimulant abuse, uncomplicated ([facility] [address]  Return in about 6 months (around 2023) for fasting labs/med refills . Subjective   HPI needs refills of med's for  DM not checking BS stats another Md in [place] took her off the metformin  HTN not checking but no chest pain no shortness of breath  Reflux controlled with current med  Cholesterol needs refills  Substance abuse states has been abstinent and is seeing a mental health provider unable to say what if any med's she is on. Review of Systems       Objective   Patient-Reported Vitals  No data recorded     Physical Exam     In no acute distress        Gerardo Luna, was evaluated through a synchronous (real-time) audio-video encounter.  The patient (or guardian if applicable) is aware that this is a billable service, which includes applicable co-pays. This Virtual Visit was conducted with patient's (and/or legal guardian's) consent. The visit was conducted pursuant to the emergency declaration under the [facility], [address] [place] [address] authority and the [employer] and byyd General Act. Patient identification was verified, and a caregiver was present when appropriate. The patient was located at Home: [address] [place] [address],[phone]. Provider was located at [facility] Dept): [id number],  [place] [address].         --Carlos Cm, APRN - NP

## 2022-09-01 ENCOUNTER — TELEPHONE (OUTPATIENT)
Dept: FAMILY MEDICINE CLINIC | Facility: CLINIC | Age: 42
End: 2022-09-01

## 2022-09-01 ENCOUNTER — NURSE TRIAGE (OUTPATIENT)
Dept: OTHER | Facility: CLINIC | Age: 42
End: 2022-09-01

## 2022-09-01 NOTE — TELEPHONE ENCOUNTER
Patient called this morning wanting a visit to be seen because she tested positive for covid last week and she is still feeling bad and feels like she has pneumonia. I had asked her if she would like for me to make her an appointment and she agreed to it. I told her I had a virtual appointment this afternoon at 4:20PM. Patient stated that it was fine. So at [address] I called her to get her pre-charted. I left a message and then she called back. My exact words were Rony Lubin this is teja from dr. Christin Abel office I am calling to go ahead and get you pre-charted for your virtual visit. \" She got mad stating she didn't know it was a virtual visit. Why cant she come into the office. She has waited  8 hrs to be seen and that she could have just went to the ER. I am sitting in the parking lot thinking I was gonna be see. \" She hung up on me. I called her back to see if I can still pre-chart her for her visit. She would not let me. She said how can they diagnosed her  when they needed to swab me. \" She then said not to worry about it that she will just go to the ER.

## 2022-09-01 NOTE — TELEPHONE ENCOUNTER
Covid last Wednesday feels like she has pneumonia, At night feels like she can not breath, and she can not sleep.  Made patient an appointment

## 2022-09-01 NOTE — TELEPHONE ENCOUNTER
Received call from GEMMA RENTERIA JR. [facility] at [facility] with [employer] Complaint. Subjective: Caller states \"During the day I feel ok. This morning is the first day I have a cough. When I lay down I have problems. It feels like I have fluid on my chest. When I lay down my nasal cavity fills up with fluid. The throws me into a panic attack. And my chest feels so heavy then I lay down. \"     Current Symptoms: SOB at night when laying flat. States cannot lay on her side. States she was told in the past she has COPD. Pt speaking in complete sentences without difficulty during triage. States breathing quicker than normal.     Onset: since she had COVID 8/22/22  unchanged    Associated Symptoms: past 3-4 days SOB \"when I walk too much\"     Pain Severity: 0/10; ; denies chest pain. Temperature: \"I am not sure\"     What has been tried: ER on Saturday. Eloped    LMP:  \"last month sometime, maybe right before the COVID\"  Pregnant: NA    Recommended disposition: Go to Office Now    Care advice provided, patient verbalizes understanding; denies any other questions or concerns; instructed to call back for any new or worsening symptoms. Patient/Caller agrees with recommended disposition; writer provided warm transfer to [facility] for appointment scheduling     Attention Provider: Thank you for allowing me to participate in the care of your patient. The patient was connected to triage in response to information provided to the ECC/PSC. Please do not respond through this encounter as the response is not directed to a shared pool.   Reason for Disposition   MILD difficulty breathing (e.g., minimal/no SOB at rest, SOB with walking, pulse < 100) of new-onset or worse than normal    Protocols used: Breathing Difficulty-ADULT-[place]

## 2022-09-09 DIAGNOSIS — E78.5 DYSLIPIDEMIA: ICD-10-CM

## 2022-09-09 DIAGNOSIS — E11.40 TYPE 2 DIABETES MELLITUS WITH DIABETIC NEUROPATHY, WITHOUT LONG-TERM CURRENT USE OF INSULIN (HCC): ICD-10-CM

## 2022-11-01 ENCOUNTER — HOSPITAL ENCOUNTER (EMERGENCY)
Age: 42
Discharge: HOME | End: 2022-11-01
Attending: EMERGENCY MEDICINE
Payer: COMMERCIAL

## 2022-11-01 VITALS
HEART RATE: 98 BPM | BODY MASS INDEX: 34.83 KG/M2 | DIASTOLIC BLOOD PRESSURE: 88 MMHG | SYSTOLIC BLOOD PRESSURE: 127 MMHG | TEMPERATURE: 98.7 F | RESPIRATION RATE: 20 BRPM | HEIGHT: 64 IN | WEIGHT: 204 LBS | OXYGEN SATURATION: 97 %

## 2022-11-01 DIAGNOSIS — J06.9 UPPER RESPIRATORY TRACT INFECTION, UNSPECIFIED TYPE: Primary | ICD-10-CM

## 2022-11-01 LAB
FLUAV AG NPH QL IA: NEGATIVE
FLUBV AG NPH QL IA: NEGATIVE
SARS-COV-2 RDRP RESP QL NAA+PROBE: NOT DETECTED
SOURCE: NORMAL
SPECIMEN SOURCE: NORMAL

## 2022-11-01 PROCEDURE — 99283 EMERGENCY DEPT VISIT LOW MDM: CPT

## 2022-11-01 PROCEDURE — 87804 INFLUENZA ASSAY W/OPTIC: CPT

## 2022-11-01 PROCEDURE — 87635 SARS-COV-2 COVID-19 AMP PRB: CPT

## 2022-11-01 ASSESSMENT — PAIN DESCRIPTION - LOCATION: LOCATION: HEAD

## 2022-11-01 ASSESSMENT — PAIN - FUNCTIONAL ASSESSMENT
PAIN_FUNCTIONAL_ASSESSMENT: 0-10
PAIN_FUNCTIONAL_ASSESSMENT: 0-10

## 2022-11-01 ASSESSMENT — PAIN SCALES - GENERAL: PAINLEVEL_OUTOF10: 6

## 2022-11-01 ASSESSMENT — ENCOUNTER SYMPTOMS
SHORTNESS OF BREATH: 0
ABDOMINAL PAIN: 0
COUGH: 1
SORE THROAT: 1

## 2022-11-01 NOTE — ED NOTES
I have reviewed discharge instructions with the patient. The patient verbalized understanding. Patient left ED via Discharge Method: ambulatory to Home with self. Opportunity for questions and clarification provided. Patient given 0 scripts. To continue your aftercare when you leave the hospital, you may receive an automated call from our care team to check in on how you are doing. This is a free service and part of our promise to provide the best care and service to meet your aftercare needs.  If you have questions, or wish to unsubscribe from this service please call [phone]. Thank you for Choosing our [facility] Emergency Department.           Clay Chambers RN  11/01/22 7125
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
## 2022-11-01 NOTE — DISCHARGE INSTRUCTIONS
Both of your swabs are negative. This is viral in nature. You will need to treat symptomatically at home with Tylenol ibuprofen.

## 2022-11-06 ENCOUNTER — HOSPITAL ENCOUNTER (EMERGENCY)
Age: 42
Discharge: HOME | End: 2022-11-06
Attending: EMERGENCY MEDICINE
Payer: MEDICARE

## 2022-11-06 ENCOUNTER — APPOINTMENT (OUTPATIENT)
Dept: GENERAL RADIOLOGY | Age: 42
End: 2022-11-06
Payer: MEDICARE

## 2022-11-06 VITALS
WEIGHT: 204 LBS | HEART RATE: 93 BPM | SYSTOLIC BLOOD PRESSURE: 123 MMHG | OXYGEN SATURATION: 97 % | DIASTOLIC BLOOD PRESSURE: 88 MMHG | HEIGHT: 64 IN | RESPIRATION RATE: 16 BRPM | TEMPERATURE: 98.8 F | BODY MASS INDEX: 34.83 KG/M2

## 2022-11-06 DIAGNOSIS — R05.1 ACUTE COUGH: Primary | ICD-10-CM

## 2022-11-06 DIAGNOSIS — R06.2 WHEEZING: ICD-10-CM

## 2022-11-06 PROCEDURE — 94640 AIRWAY INHALATION TREATMENT: CPT

## 2022-11-06 PROCEDURE — 99283 EMERGENCY DEPT VISIT LOW MDM: CPT

## 2022-11-06 PROCEDURE — 71046 X-RAY EXAM CHEST 2 VIEWS: CPT

## 2022-11-06 PROCEDURE — 6370000000 HC RX 637 (ALT 250 FOR IP): Performed by: EMERGENCY MEDICINE

## 2022-11-06 RX ORDER — IPRATROPIUM BROMIDE AND ALBUTEROL SULFATE 2.5; .5 MG/3ML; MG/3ML
1 SOLUTION RESPIRATORY (INHALATION)
Status: COMPLETED | OUTPATIENT
Start: 2022-11-06 | End: 2022-11-06

## 2022-11-06 RX ORDER — BENZONATATE 100 MG/1
100 CAPSULE ORAL 2 TIMES DAILY PRN
Qty: 14 CAPSULE | Refills: 0 | Status: SHIPPED | OUTPATIENT
Start: 2022-11-06 | End: 2022-11-13

## 2022-11-06 RX ORDER — PREDNISONE 20 MG/1
20 TABLET ORAL 2 TIMES DAILY
Qty: 10 TABLET | Refills: 0 | Status: SHIPPED | OUTPATIENT
Start: 2022-11-06 | End: 2022-11-11

## 2022-11-06 RX ORDER — ALBUTEROL SULFATE 90 UG/1
2 AEROSOL, METERED RESPIRATORY (INHALATION) 4 TIMES DAILY PRN
Qty: 4 EACH | Refills: 0 | Status: SHIPPED | OUTPATIENT
Start: 2022-11-06 | End: 2023-01-05

## 2022-11-06 RX ADMIN — PREDNISONE 60 MG: 10 TABLET ORAL at 17:33

## 2022-11-06 RX ADMIN — IPRATROPIUM BROMIDE AND ALBUTEROL SULFATE 1 AMPULE: .5; 3 SOLUTION RESPIRATORY (INHALATION) at 17:32

## 2022-11-06 ASSESSMENT — ENCOUNTER SYMPTOMS
SHORTNESS OF BREATH: 1
ABDOMINAL PAIN: 0
DIARRHEA: 0
VOMITING: 0
SORE THROAT: 1
VOICE CHANGE: 0
NAUSEA: 0
COLOR CHANGE: 0
TROUBLE SWALLOWING: 0
COUGH: 1

## 2022-11-06 ASSESSMENT — PAIN SCALES - GENERAL: PAINLEVEL_OUTOF10: 0

## 2022-11-06 ASSESSMENT — PAIN - FUNCTIONAL ASSESSMENT: PAIN_FUNCTIONAL_ASSESSMENT: 0-10

## 2022-11-06 NOTE — DISCHARGE INSTRUCTIONS
You were evaluated in the emergency department today for continued cough and some mild wheezing today  You are given a DuoNeb treatment    Chest x-ray is negative for any acute cardiopulmonary abnormality, no pneumonia    Physical exam is reassuring. Wheezing greatly reduced I written you prescription for course of prednisone to be started tomorrow after DuoNeb treatment      I have written you for Tessalon and I have refilled your albuterol inhaler.   Pearls which is a cough medication      Contact your primary care doctor tomorrow to schedule follow-up this coming week    Return to the emergency department if increased work of breathing, wheezing, general worsening of your condition

## 2022-11-06 NOTE — ED TRIAGE NOTES
Pt reports recently tested positive for flu (last Tuesday) and cleared to return to work today but symptoms have continued.  (+)cough causing burning in chest and throat irritation with dyspnea   A&OX4

## 2022-11-06 NOTE — Clinical Note
Lizzette Rasmussen was seen and treated in our emergency department on 11/6/2022. She may return to work on 11/08/2022. If you have any questions or concerns, please don't hesitate to call.       EARL Raymundo

## 2022-11-06 NOTE — Clinical Note
Austen Porter was seen and treated in our emergency department on 11/6/2022. She may return to work on 11/08/2022. If you have any questions or concerns, please don't hesitate to call.       EARL Mcdonald
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
## 2022-11-06 NOTE — ED NOTES
I have reviewed discharge instructions with the patient. The patient verbalized understanding. Patient left ED via Discharge Method: ambulatory to Home with self. Opportunity for questions and clarification provided. Patient given 3 scripts. To continue your aftercare when you leave the hospital, you may receive an automated call from our care team to check in on how you are doing. This is a free service and part of our promise to provide the best care and service to meet your aftercare needs.  If you have questions, or wish to unsubscribe from this service please call [phone]. Thank you for Choosing our [employer] Emergency Department.         Tone Estrada RN  11/06/22 2139

## 2024-02-27 ENCOUNTER — OFFICE VISIT (OUTPATIENT)
Dept: FAMILY MEDICINE CLINIC | Facility: CLINIC | Age: 44
End: 2024-02-27
Payer: COMMERCIAL

## 2024-02-27 VITALS
TEMPERATURE: 97.2 F | OXYGEN SATURATION: 97 % | SYSTOLIC BLOOD PRESSURE: 138 MMHG | HEIGHT: 64 IN | WEIGHT: 195 LBS | HEART RATE: 90 BPM | DIASTOLIC BLOOD PRESSURE: 78 MMHG | BODY MASS INDEX: 33.29 KG/M2

## 2024-02-27 DIAGNOSIS — I10 PRIMARY HYPERTENSION: ICD-10-CM

## 2024-02-27 DIAGNOSIS — E55.9 VITAMIN D DEFICIENCY: ICD-10-CM

## 2024-02-27 DIAGNOSIS — J44.9 CHRONIC OBSTRUCTIVE PULMONARY DISEASE, UNSPECIFIED COPD TYPE (HCC): ICD-10-CM

## 2024-02-27 DIAGNOSIS — N63.0 MASS OF BREAST, UNSPECIFIED LATERALITY: ICD-10-CM

## 2024-02-27 DIAGNOSIS — Z12.11 COLON CANCER SCREENING: ICD-10-CM

## 2024-02-27 DIAGNOSIS — I25.10 ATHEROSCLEROSIS OF CORONARY ARTERY OF NATIVE HEART WITHOUT ANGINA PECTORIS, UNSPECIFIED VESSEL OR LESION TYPE: ICD-10-CM

## 2024-02-27 DIAGNOSIS — E11.40 TYPE 2 DIABETES MELLITUS WITH DIABETIC NEUROPATHY, WITHOUT LONG-TERM CURRENT USE OF INSULIN (HCC): Primary | ICD-10-CM

## 2024-02-27 DIAGNOSIS — E78.00 HYPERCHOLESTEROLEMIA: ICD-10-CM

## 2024-02-27 DIAGNOSIS — E53.8 DEFICIENCY OF VITAMIN B12: ICD-10-CM

## 2024-02-27 DIAGNOSIS — R53.83 OTHER FATIGUE: ICD-10-CM

## 2024-02-27 DIAGNOSIS — F41.9 ANXIETY: ICD-10-CM

## 2024-02-27 LAB
25(OH)D3 SERPL-MCNC: 13.4 NG/ML (ref 30–100)
ALBUMIN SERPL-MCNC: 3.8 G/DL (ref 3.5–5)
ALBUMIN/GLOB SERPL: 1.2 (ref 0.4–1.6)
ALP SERPL-CCNC: 63 U/L (ref 50–136)
ALT SERPL-CCNC: 26 U/L (ref 12–65)
ANION GAP SERPL CALC-SCNC: 7 MMOL/L (ref 2–11)
AST SERPL-CCNC: 16 U/L (ref 15–37)
BASOPHILS # BLD: 0.1 K/UL (ref 0–0.2)
BASOPHILS NFR BLD: 0 % (ref 0–2)
BILIRUB SERPL-MCNC: 0.3 MG/DL (ref 0.2–1.1)
BUN SERPL-MCNC: 13 MG/DL (ref 6–23)
CALCIUM SERPL-MCNC: 9.8 MG/DL (ref 8.3–10.4)
CHLORIDE SERPL-SCNC: 107 MMOL/L (ref 103–113)
CHOLEST SERPL-MCNC: 218 MG/DL
CO2 SERPL-SCNC: 27 MMOL/L (ref 21–32)
CREAT SERPL-MCNC: 0.8 MG/DL (ref 0.6–1)
DIFFERENTIAL METHOD BLD: ABNORMAL
EOSINOPHIL # BLD: 0 K/UL (ref 0–0.8)
EOSINOPHIL NFR BLD: 0 % (ref 0.5–7.8)
ERYTHROCYTE [DISTWIDTH] IN BLOOD BY AUTOMATED COUNT: 14.4 % (ref 11.9–14.6)
GLOBULIN SER CALC-MCNC: 3.2 G/DL (ref 2.8–4.5)
GLUCOSE SERPL-MCNC: 93 MG/DL (ref 65–100)
HCT VFR BLD AUTO: 43.6 % (ref 35.8–46.3)
HDLC SERPL-MCNC: 42 MG/DL (ref 40–60)
HDLC SERPL: 5.2
HGB BLD-MCNC: 13.7 G/DL (ref 11.7–15.4)
IMM GRANULOCYTES # BLD AUTO: 0 K/UL (ref 0–0.5)
IMM GRANULOCYTES NFR BLD AUTO: 0 % (ref 0–5)
LDLC SERPL CALC-MCNC: 150.2 MG/DL
LYMPHOCYTES # BLD: 3.3 K/UL (ref 0.5–4.6)
LYMPHOCYTES NFR BLD: 29 % (ref 13–44)
MCH RBC QN AUTO: 26.9 PG (ref 26.1–32.9)
MCHC RBC AUTO-ENTMCNC: 31.4 G/DL (ref 31.4–35)
MCV RBC AUTO: 85.5 FL (ref 82–102)
MONOCYTES # BLD: 0.5 K/UL (ref 0.1–1.3)
MONOCYTES NFR BLD: 4 % (ref 4–12)
NEUTS SEG # BLD: 7.4 K/UL (ref 1.7–8.2)
NEUTS SEG NFR BLD: 67 % (ref 43–78)
NRBC # BLD: 0 K/UL (ref 0–0.2)
PLATELET # BLD AUTO: 351 K/UL (ref 150–450)
PMV BLD AUTO: 10.1 FL (ref 9.4–12.3)
POTASSIUM SERPL-SCNC: 3.3 MMOL/L (ref 3.5–5.1)
PROT SERPL-MCNC: 7 G/DL (ref 6.3–8.2)
RBC # BLD AUTO: 5.1 M/UL (ref 4.05–5.2)
SODIUM SERPL-SCNC: 141 MMOL/L (ref 136–146)
TRIGL SERPL-MCNC: 129 MG/DL (ref 35–150)
TSH, 3RD GENERATION: 0.62 UIU/ML (ref 0.36–3.74)
VIT B12 SERPL-MCNC: 797 PG/ML (ref 193–986)
VLDLC SERPL CALC-MCNC: 25.8 MG/DL (ref 6–23)
WBC # BLD AUTO: 11.2 K/UL (ref 4.3–11.1)

## 2024-02-27 PROCEDURE — 3075F SYST BP GE 130 - 139MM HG: CPT | Performed by: NURSE PRACTITIONER

## 2024-02-27 PROCEDURE — 3078F DIAST BP <80 MM HG: CPT | Performed by: NURSE PRACTITIONER

## 2024-02-27 PROCEDURE — 99204 OFFICE O/P NEW MOD 45 MIN: CPT | Performed by: NURSE PRACTITIONER

## 2024-02-27 SDOH — ECONOMIC STABILITY: FOOD INSECURITY: WITHIN THE PAST 12 MONTHS, YOU WORRIED THAT YOUR FOOD WOULD RUN OUT BEFORE YOU GOT MONEY TO BUY MORE.: NEVER TRUE

## 2024-02-27 SDOH — ECONOMIC STABILITY: HOUSING INSECURITY
IN THE LAST 12 MONTHS, WAS THERE A TIME WHEN YOU DID NOT HAVE A STEADY PLACE TO SLEEP OR SLEPT IN A SHELTER (INCLUDING NOW)?: NO

## 2024-02-27 SDOH — ECONOMIC STABILITY: INCOME INSECURITY: HOW HARD IS IT FOR YOU TO PAY FOR THE VERY BASICS LIKE FOOD, HOUSING, MEDICAL CARE, AND HEATING?: NOT HARD AT ALL

## 2024-02-27 SDOH — ECONOMIC STABILITY: FOOD INSECURITY: WITHIN THE PAST 12 MONTHS, THE FOOD YOU BOUGHT JUST DIDN'T LAST AND YOU DIDN'T HAVE MONEY TO GET MORE.: NEVER TRUE

## 2024-02-27 ASSESSMENT — ENCOUNTER SYMPTOMS
ABDOMINAL PAIN: 0
WHEEZING: 0
VOMITING: 0
SHORTNESS OF BREATH: 0
DIARRHEA: 0
CONSTIPATION: 0
NAUSEA: 0

## 2024-02-27 ASSESSMENT — PATIENT HEALTH QUESTIONNAIRE - PHQ9
8. MOVING OR SPEAKING SO SLOWLY THAT OTHER PEOPLE COULD HAVE NOTICED. OR THE OPPOSITE, BEING SO FIGETY OR RESTLESS THAT YOU HAVE BEEN MOVING AROUND A LOT MORE THAN USUAL: 0
1. LITTLE INTEREST OR PLEASURE IN DOING THINGS: 0
SUM OF ALL RESPONSES TO PHQ9 QUESTIONS 1 & 2: 0
SUM OF ALL RESPONSES TO PHQ QUESTIONS 1-9: 0
9. THOUGHTS THAT YOU WOULD BE BETTER OFF DEAD, OR OF HURTING YOURSELF: 0
7. TROUBLE CONCENTRATING ON THINGS, SUCH AS READING THE NEWSPAPER OR WATCHING TELEVISION: 0
6. FEELING BAD ABOUT YOURSELF - OR THAT YOU ARE A FAILURE OR HAVE LET YOURSELF OR YOUR FAMILY DOWN: 0
5. POOR APPETITE OR OVEREATING: 0
4. FEELING TIRED OR HAVING LITTLE ENERGY: 0
3. TROUBLE FALLING OR STAYING ASLEEP: 0
SUM OF ALL RESPONSES TO PHQ QUESTIONS 1-9: 0
10. IF YOU CHECKED OFF ANY PROBLEMS, HOW DIFFICULT HAVE THESE PROBLEMS MADE IT FOR YOU TO DO YOUR WORK, TAKE CARE OF THINGS AT HOME, OR GET ALONG WITH OTHER PEOPLE: 0
SUM OF ALL RESPONSES TO PHQ QUESTIONS 1-9: 0
SUM OF ALL RESPONSES TO PHQ QUESTIONS 1-9: 0

## 2024-02-27 NOTE — PROGRESS NOTES
[facility]  [address] [place] [address]  Phone [phone]  Fax:  [phone]    Patient: Fauzia Mendieta  YOB: 1980  Patient Age 44 y.o.  Patient sex: female  Medical Record:  [id number]  Visit Date: 02/28/24    [facility] Clinic Note     Chief Complaint   Patient presents with    Establish Care     Pt here to establish care. Pt c/o fatigue. Pt also has lumps on both breast she would like checked. Pt would like to discuss b12 injections along with being tested for COPD. Pt would like updated labs as well, pt also feels strain in her chest.        History of Present Illness:  Ms. Mendieta is a pleasant 44 year-old female with past medical history as noted below who presents to establish care.  Patient does not have medications with her for visit. Patient is being seen for the following conditions:    DM Type 2:   States controlled.  Last A1C 6.2 on 11/16/23. Currently on Ozempic 0.5 mg.  Does not check BS regularly, does not have log with her.  Denies any associated symptoms.    Aspirin Use: yes  ACE-I/ARB use: yes  Yearly Microalbumin: will do with upcoming labs  Last foot exam: 11/16/23  Feeling some fatigue, no blood sugar logs at visit today    Hypertension:  Controlled, 138/78 in office today. Currently on Dyazide, takes as prescribed.  States she was previously on Metoprolol and Losartan but \"didn't do well\" with them.  Reports history of MI in 2020 or 2021, does not follow with cardiology.  Would like referral.   She denies any other associated symptoms: No CP, [place]/SOB, palpitations, lower extremity edema, dizziness, syncopal episodes.  Does not monitor home blood pressures.    Hypercholesterolemia:  On lipitor.  Lipids elevated in November, will recheck.      COPD:  Has Symbicort and albuterol inhaler.  Denies increase in wheezing, shortness of breath.      Anxiety:  States managed by [facility].  Stable on Buspar, Vistaril.   She she denies

## 2024-02-28 LAB
EST. AVERAGE GLUCOSE BLD GHB EST-MCNC: 134 MG/DL
HBA1C MFR BLD: 6.3 % (ref 4.8–5.6)

## 2024-03-01 ENCOUNTER — OFFICE VISIT (OUTPATIENT)
Dept: FAMILY MEDICINE CLINIC | Facility: CLINIC | Age: 44
End: 2024-03-01
Payer: MEDICARE

## 2024-03-01 VITALS
WEIGHT: 196.38 LBS | OXYGEN SATURATION: 100 % | DIASTOLIC BLOOD PRESSURE: 72 MMHG | TEMPERATURE: 98.2 F | SYSTOLIC BLOOD PRESSURE: 107 MMHG | HEART RATE: 94 BPM | BODY MASS INDEX: 33.53 KG/M2 | HEIGHT: 64 IN

## 2024-03-01 DIAGNOSIS — Z11.3 SCREENING EXAMINATION FOR VENEREAL DISEASE: Primary | ICD-10-CM

## 2024-03-01 DIAGNOSIS — S61.212A LACERATION OF RIGHT MIDDLE FINGER WITHOUT FOREIGN BODY WITHOUT DAMAGE TO NAIL, INITIAL ENCOUNTER: ICD-10-CM

## 2024-03-01 DIAGNOSIS — Z11.3 SCREENING EXAMINATION FOR VENEREAL DISEASE: ICD-10-CM

## 2024-03-01 LAB
HAV IGM SER QL: NONREACTIVE
HBV CORE IGM SER QL: NONREACTIVE
HBV SURFACE AG SER QL: NONREACTIVE
HCV AB SER QL: NONREACTIVE
HIV 1+2 AB+HIV1 P24 AG SERPL QL IA: NONREACTIVE
HIV 1/2 RESULT COMMENT: NORMAL

## 2024-03-01 PROCEDURE — G8427 DOCREV CUR MEDS BY ELIG CLIN: HCPCS | Performed by: NURSE PRACTITIONER

## 2024-03-01 PROCEDURE — 4004F PT TOBACCO SCREEN RCVD TLK: CPT | Performed by: NURSE PRACTITIONER

## 2024-03-01 PROCEDURE — 3074F SYST BP LT 130 MM HG: CPT | Performed by: NURSE PRACTITIONER

## 2024-03-01 PROCEDURE — 99214 OFFICE O/P EST MOD 30 MIN: CPT | Performed by: NURSE PRACTITIONER

## 2024-03-01 PROCEDURE — G8417 CALC BMI ABV UP PARAM F/U: HCPCS | Performed by: NURSE PRACTITIONER

## 2024-03-01 PROCEDURE — G8484 FLU IMMUNIZE NO ADMIN: HCPCS | Performed by: NURSE PRACTITIONER

## 2024-03-01 PROCEDURE — 3078F DIAST BP <80 MM HG: CPT | Performed by: NURSE PRACTITIONER

## 2024-03-01 ASSESSMENT — ENCOUNTER SYMPTOMS
SHORTNESS OF BREATH: 0
ABDOMINAL PAIN: 0
WHEEZING: 0
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
## 2024-03-04 ENCOUNTER — TELEPHONE (OUTPATIENT)
Dept: FAMILY MEDICINE CLINIC | Facility: CLINIC | Age: 44
End: 2024-03-04

## 2024-03-04 LAB — RPR SER QL: NONREACTIVE

## 2024-03-04 NOTE — TELEPHONE ENCOUNTER
----- Message from Yasmine Barlow sent at 3/4/2024  8:20 AM EST -----  Subject: Message to Provider    QUESTIONS  Information for Provider? Pt calling in b/c she states someone from office   called her about test results. Pt asks that someone from office please   call her back right away.  ---------------------------------------------------------------------------  --------------  CALL BACK INFO  [phone]; OK to leave message on voicemail  ---------------------------------------------------------------------------  --------------  SCRIPT ANSWERS  undefined

## 2024-03-04 NOTE — TELEPHONE ENCOUNTER
----- Message from HITESH Azar NP sent at 3/1/2024  3:21 PM EST -----  I don't think this message went through on 2/29/24.    Please let Ms. Mendieta know her Vitamin D level was low at 13.4, please start 800 to 1000 international units Vitamin D3 daily.  A1C is 6.3, great job!  Cholesterol is elevated, is she taking Atorvastatin?  Potassium is slightly low at 3.3, recommend potassium enriched foods such as spinach, broccoli.  B12 and TSH are normal.       The 10-year ASCVD risk score (Helio MEYER, et al., 2019) is: 26.2%    Values used to calculate the score:      Age: 44 years      Sex: Female      Is Non- : Yes      Diabetic: Yes      Tobacco smoker: Yes      Systolic Blood Pressure: 138 mmHg      Is BP treated: Yes      HDL Cholesterol: 42 MG/DL      Total Cholesterol: 218 MG/DL     Thanks,  HITESH Azar NP

## 2024-03-04 NOTE — TELEPHONE ENCOUNTER
----- Message from HITESH Azar NP sent at 3/4/2024  2:52 PM EST -----  Please let Ms. Mendieta know her syphilis, hepatitis panel, HIV tests were negative.  We are still awaiting others.    Thanks,  HITESH Azar NP

## 2024-03-07 LAB
C TRACH RRNA UR QL NAA+PROBE: NEGATIVE
M GENITALIUM DNA SPEC QL NAA+PROBE: NEGATIVE
M HOMINIS DNA SPEC QL NAA+PROBE: NEGATIVE
N GONORRHOEA RRNA UR QL NAA+PROBE: NEGATIVE
UREAPLASMA DNA SPEC QL NAA+PROBE: NEGATIVE

## 2024-03-19 ENCOUNTER — OFFICE VISIT (OUTPATIENT)
Dept: FAMILY MEDICINE CLINIC | Facility: CLINIC | Age: 44
End: 2024-03-19
Payer: MEDICARE

## 2024-03-19 VITALS
OXYGEN SATURATION: 96 % | HEART RATE: 100 BPM | HEIGHT: 64 IN | TEMPERATURE: 98.2 F | DIASTOLIC BLOOD PRESSURE: 64 MMHG | SYSTOLIC BLOOD PRESSURE: 132 MMHG | BODY MASS INDEX: 34.15 KG/M2 | WEIGHT: 200 LBS

## 2024-03-19 DIAGNOSIS — E11.40 TYPE 2 DIABETES MELLITUS WITH DIABETIC NEUROPATHY, WITHOUT LONG-TERM CURRENT USE OF INSULIN (HCC): ICD-10-CM

## 2024-03-19 DIAGNOSIS — R10.13 EPIGASTRIC PAIN: Primary | ICD-10-CM

## 2024-03-19 DIAGNOSIS — M25.551 PAIN OF RIGHT HIP: ICD-10-CM

## 2024-03-19 DIAGNOSIS — K21.9 GASTROESOPHAGEAL REFLUX DISEASE, UNSPECIFIED WHETHER ESOPHAGITIS PRESENT: ICD-10-CM

## 2024-03-19 DIAGNOSIS — G56.01 CARPAL TUNNEL SYNDROME ON RIGHT: ICD-10-CM

## 2024-03-19 PROCEDURE — 99214 OFFICE O/P EST MOD 30 MIN: CPT | Performed by: NURSE PRACTITIONER

## 2024-03-19 PROCEDURE — G8484 FLU IMMUNIZE NO ADMIN: HCPCS | Performed by: NURSE PRACTITIONER

## 2024-03-19 PROCEDURE — 93000 ELECTROCARDIOGRAM COMPLETE: CPT | Performed by: NURSE PRACTITIONER

## 2024-03-19 PROCEDURE — 4004F PT TOBACCO SCREEN RCVD TLK: CPT | Performed by: NURSE PRACTITIONER

## 2024-03-19 PROCEDURE — 3078F DIAST BP <80 MM HG: CPT | Performed by: NURSE PRACTITIONER

## 2024-03-19 PROCEDURE — G8417 CALC BMI ABV UP PARAM F/U: HCPCS | Performed by: NURSE PRACTITIONER

## 2024-03-19 PROCEDURE — 3075F SYST BP GE 130 - 139MM HG: CPT | Performed by: NURSE PRACTITIONER

## 2024-03-19 PROCEDURE — G8427 DOCREV CUR MEDS BY ELIG CLIN: HCPCS | Performed by: NURSE PRACTITIONER

## 2024-03-19 PROCEDURE — 2022F DILAT RTA XM EVC RTNOPTHY: CPT | Performed by: NURSE PRACTITIONER

## 2024-03-19 PROCEDURE — 3044F HG A1C LEVEL LT 7.0%: CPT | Performed by: NURSE PRACTITIONER

## 2024-03-19 RX ORDER — OMEPRAZOLE 20 MG/1
20 CAPSULE, DELAYED RELEASE ORAL DAILY
Qty: 90 CAPSULE | Refills: 1 | Status: SHIPPED | OUTPATIENT
Start: 2024-03-19

## 2024-03-19 ASSESSMENT — ENCOUNTER SYMPTOMS
WHEEZING: 0
CONSTIPATION: 0
ABDOMINAL PAIN: 1
DIARRHEA: 0
VOMITING: 0
NAUSEA: 0
SHORTNESS OF BREATH: 0
COUGH: 0
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
## 2024-03-27 NOTE — PROGRESS NOTES
[facility] CARDIOLOGY  [address] [place] [address], [address]  [place] [address]  PHONE: [phone]      24    NAME:  Fauzia Mendieta  : 1980  MRN: [id number]         SUBJECTIVE:   Fauzia Mendieta is a 44 y.o. female seen for a consultation visit regarding the following:     Chief Complaint   Patient presents with    Consultation    Chest Pain            HPI:  Consultation is requested by Wendy Pitts APRN - NP for evaluation of Consultation and Chest Pain   .    Follow up.  I actually met patient in  and will summarize notes below.  She has chest pain frequently, described as highly painful and associated with panic and dyspnea, other times feels a pulling pain in the chest.  She frequently cites her anxiety and is regularly seen by mental health.        She has a prior history of methamphetamine use, last 3 months ago.  She's previously used multiple substances but last few years meth.  OD'd on fentanyl about 4 years ago and quit.  No regular exercise but joining the [employer].  She's working on her spiritual life as well.  She's reduced her smoking down to about 1 pack every 3 days.      Her lipids were elevated, she admits that she wasn't taking her meds but doing better now.      Risks include type 2 DM, hypertension, dyslipidemia, smoker, obesity, prior methamphetamine use, mental health challenges          PAST CARDIAC HISTORY:   Methamphetamine use   Aug 2021- multiple visits [facility] and , leaving AMA each time   Echo ([facility]) - EF 45-50%, global HK   TMET - unable to achieve HR.  4 MET workload, [facility] TMS 2. Declined NST or cath.            Cardiac Medications       Diuretic Combinations       triamterene-hydroCHLOROthiazide (DYAZIDE) 37.5-25 MG per capsule Take 1 capsule by mouth daily       HMG CoA Reductase Inhibitors       atorvastatin (LIPITOR) 80 MG tablet Take 1 tablet by mouth at bedtime       Angiotensin II Receptor Antagonists       losartan (COZAAR) 25 MG tablet

## 2024-03-28 ENCOUNTER — INITIAL CONSULT (OUTPATIENT)
Age: 44
End: 2024-03-28
Payer: MEDICARE

## 2024-03-28 VITALS
DIASTOLIC BLOOD PRESSURE: 62 MMHG | WEIGHT: 201 LBS | BODY MASS INDEX: 34.31 KG/M2 | HEIGHT: 64 IN | HEART RATE: 96 BPM | SYSTOLIC BLOOD PRESSURE: 124 MMHG

## 2024-03-28 DIAGNOSIS — R07.89 CHEST DISCOMFORT: ICD-10-CM

## 2024-03-28 DIAGNOSIS — E11.9 TYPE 2 DIABETES MELLITUS WITHOUT COMPLICATION, WITHOUT LONG-TERM CURRENT USE OF INSULIN (HCC): ICD-10-CM

## 2024-03-28 DIAGNOSIS — I10 ESSENTIAL HYPERTENSION: Primary | ICD-10-CM

## 2024-03-28 DIAGNOSIS — R06.02 SHORTNESS OF BREATH: ICD-10-CM

## 2024-03-28 DIAGNOSIS — Z72.0 TOBACCO ABUSE: ICD-10-CM

## 2024-03-28 DIAGNOSIS — E78.5 DYSLIPIDEMIA: ICD-10-CM

## 2024-03-28 PROCEDURE — G8417 CALC BMI ABV UP PARAM F/U: HCPCS | Performed by: INTERNAL MEDICINE

## 2024-03-28 PROCEDURE — 3044F HG A1C LEVEL LT 7.0%: CPT | Performed by: INTERNAL MEDICINE

## 2024-03-28 PROCEDURE — 99214 OFFICE O/P EST MOD 30 MIN: CPT | Performed by: INTERNAL MEDICINE

## 2024-03-28 PROCEDURE — G8427 DOCREV CUR MEDS BY ELIG CLIN: HCPCS | Performed by: INTERNAL MEDICINE

## 2024-03-28 PROCEDURE — 4004F PT TOBACCO SCREEN RCVD TLK: CPT | Performed by: INTERNAL MEDICINE

## 2024-03-28 PROCEDURE — G8484 FLU IMMUNIZE NO ADMIN: HCPCS | Performed by: INTERNAL MEDICINE

## 2024-03-28 PROCEDURE — 3078F DIAST BP <80 MM HG: CPT | Performed by: INTERNAL MEDICINE

## 2024-03-28 PROCEDURE — 3074F SYST BP LT 130 MM HG: CPT | Performed by: INTERNAL MEDICINE

## 2024-03-28 PROCEDURE — 2022F DILAT RTA XM EVC RTNOPTHY: CPT | Performed by: INTERNAL MEDICINE

## 2024-03-28 ASSESSMENT — ENCOUNTER SYMPTOMS: SHORTNESS OF BREATH: 1

## 2024-03-28 NOTE — PATIENT INSTRUCTIONS
information.       Patient Education        Walking for Exercise: Care Instructions  Overview     Walking is one of the easiest ways to get the exercise you need for good health. A brisk, 30-minute walk each day can help you feel better and have more energy. It can help you lower your risk of disease. Walking can help you keep your bones strong and your heart healthy.  Check with your doctor before you start a walking plan if you have heart problems, other health issues, or you have not been active in a long time. Follow your doctor's instructions for safe levels of exercise.  Follow-up care is a key part of your treatment and safety. Be sure to make and go to all appointments, and call your doctor if you are having problems. It's also a good idea to know your test results and keep a list of the medicines you take.  How can you care for yourself at home?  Getting started  Start slowly and set a short-term goal. For example, walk for 5 or 10 minutes every day.  Bit by bit, increase the amount you walk every day. Try for at least 30 minutes on most days of the week. You also may want to swim, bike, or do other activities.  If finding enough time is a problem, it's fine to be active in shorter periods of time throughout your day.  To get the heart-healthy benefits of walking, you need to walk briskly enough to increase your heart rate and breathing, but not so fast that you can't talk comfortably.  Wear comfortable shoes that fit well and provide good support for your feet and ankles.  Staying with your plan  After you've made walking a habit, set a longer-term goal. You may want to set a goal of walking briskly for longer or walking farther. Experts say to do 2½ hours (150 minutes) of moderate activity a week. A faster heartbeat is what defines moderate-level activity.  To stay motivated, walk with friends, coworkers, or pets.  Use a phone cristo, pedometer, or wearable device to track your steps each day. Set a goal to

## 2024-04-04 ENCOUNTER — OFFICE VISIT (OUTPATIENT)
Dept: ORTHOPEDIC SURGERY | Age: 44
End: 2024-04-04

## 2024-04-04 DIAGNOSIS — G56.01 CARPAL TUNNEL SYNDROME OF RIGHT WRIST: ICD-10-CM

## 2024-04-04 DIAGNOSIS — M54.2 NECK PAIN: Primary | ICD-10-CM

## 2024-04-04 RX ORDER — BETAMETHASONE SODIUM PHOSPHATE AND BETAMETHASONE ACETATE 3; 3 MG/ML; MG/ML
6 INJECTION, SUSPENSION INTRA-ARTICULAR; INTRALESIONAL; INTRAMUSCULAR; SOFT TISSUE ONCE
Status: COMPLETED | OUTPATIENT
Start: 2024-04-04 | End: 2024-04-04

## 2024-04-04 RX ADMIN — BETAMETHASONE SODIUM PHOSPHATE AND BETAMETHASONE ACETATE 6 MG: 3; 3 INJECTION, SUSPENSION INTRA-ARTICULAR; INTRALESIONAL; INTRAMUSCULAR; SOFT TISSUE at 15:36

## 2024-04-04 NOTE — PROGRESS NOTES
Patient was fit for a Wrist/Forearm lacer for patients right elbow pain. Patient is instructed the brace should fit nicely with in the palm and right below the knuckles on the dorsal side of the hand. The patient was aware the brace should fit snuggly around the wrist/forearm area. The strap placed through the thumb and first finger should fit comfortably to insure the brace does not slide or shift. Patient read and signed documenting they understand and agree to [facility]'s current DME return policy.

## 2024-04-04 NOTE — PROGRESS NOTES
Orthopaedic Hand Surgery Note    Name: Fauzia Mendieta  YOB: 1980  Gender: female  MRN: [id number]    CC: New patient referred for hand numbness      HPI: Patient is a 44 y.o. female with a chief complaint of right hand numbness and tingling in the median nerve distribution. The symptoms have been going on for years. The patient does complain of night wakening and increased symptoms with driving. She says her whole forearm goes numb. She says neck pain is present constantly. She wants to make sure she is experiencing carpal tunnel syndrome, not another stroke or heart attack. Evaluation to date has included nerve conduction study 5 years ago. Treatment to date has included cortisone injection.     ROS/Meds/PSH/PMH/FH/SH: I personally reviewed the patients standard intake form.  Pertinents are discussed In the HPI    Physical Examination:    Musculoskeletal:   Cervical spine has slightly limited range of motion without tenderness to palpation, negative Spurling's test.     Examination of the right upper extremity demonstrates Normal sensation to light touch in the median distribution, normal sensation in ulnar and radial distribution, Positive carpal tunnel compression testing and Phalen testing, cap refill < 5 seconds in all fingers. Inspection reveals no thenar atrophy. Negative Tinel and elbow flexion compression test of the cubital tunnel, negative Tinel over Guyon's canal. Sensation to light touch in the ulnar 2 digits is normal with no intrinsic atrophy/weakness. No tenderness to palpation or masses noted in the forearm.    Imaging / Electrodiagnostic Tests:     Cervical Spine XR: AP, Lateral views     Clinical Indication  1. Neck pain    2. Carpal tunnel syndrome of right wrist           Report: AP, lateral x-ray of the cervical spine demonstrates mild degenerative changes    Impression: as above     Yohana Guerrier MD     I independently reviewed and interpreted her nerve conduction study

## 2024-04-09 ENCOUNTER — TELEPHONE (OUTPATIENT)
Dept: GASTROENTEROLOGY | Age: 44
End: 2024-04-09

## 2024-04-10 ENCOUNTER — PREP FOR PROCEDURE (OUTPATIENT)
Dept: GASTROENTEROLOGY | Age: 44
End: 2024-04-10

## 2024-04-10 ENCOUNTER — TELEPHONE (OUTPATIENT)
Dept: GASTROENTEROLOGY | Age: 44
End: 2024-04-10

## 2024-04-10 DIAGNOSIS — Z12.11 ENCOUNTER FOR SCREENING COLONOSCOPY: ICD-10-CM

## 2024-04-10 RX ORDER — SODIUM CHLORIDE 0.9 % (FLUSH) 0.9 %
5-40 SYRINGE (ML) INJECTION PRN
Status: CANCELLED | OUTPATIENT
Start: 2024-04-10

## 2024-04-10 RX ORDER — SODIUM CHLORIDE 9 MG/ML
25 INJECTION, SOLUTION INTRAVENOUS PRN
Status: CANCELLED | OUTPATIENT
Start: 2024-04-10

## 2024-04-10 RX ORDER — SODIUM CHLORIDE 0.9 % (FLUSH) 0.9 %
5-40 SYRINGE (ML) INJECTION EVERY 12 HOURS SCHEDULED
Status: CANCELLED | OUTPATIENT
Start: 2024-04-10

## 2024-04-11 PROBLEM — Z12.11 ENCOUNTER FOR SCREENING COLONOSCOPY: Status: ACTIVE | Noted: 2024-04-10

## 2024-04-17 ENCOUNTER — OFFICE VISIT (OUTPATIENT)
Dept: FAMILY MEDICINE CLINIC | Facility: CLINIC | Age: 44
End: 2024-04-17
Payer: MEDICARE

## 2024-04-17 VITALS
WEIGHT: 207 LBS | BODY MASS INDEX: 35.56 KG/M2 | OXYGEN SATURATION: 96 % | SYSTOLIC BLOOD PRESSURE: 128 MMHG | DIASTOLIC BLOOD PRESSURE: 64 MMHG | TEMPERATURE: 98.1 F | HEART RATE: 100 BPM

## 2024-04-17 DIAGNOSIS — E87.6 HYPOKALEMIA: ICD-10-CM

## 2024-04-17 DIAGNOSIS — L73.1 PSEUDOFOLLICULITIS BARBAE: Primary | ICD-10-CM

## 2024-04-17 DIAGNOSIS — L67.8 ABNORMAL FACIAL HAIR: Primary | ICD-10-CM

## 2024-04-17 DIAGNOSIS — L67.8 ABNORMAL FACIAL HAIR: ICD-10-CM

## 2024-04-17 LAB
ALBUMIN SERPL-MCNC: 3.4 G/DL (ref 3.5–5)
ALBUMIN/GLOB SERPL: 0.9 (ref 0.4–1.6)
ALP SERPL-CCNC: 62 U/L (ref 50–136)
ALT SERPL-CCNC: 29 U/L (ref 12–65)
ANION GAP SERPL CALC-SCNC: 4 MMOL/L (ref 2–11)
AST SERPL-CCNC: 15 U/L (ref 15–37)
BILIRUB SERPL-MCNC: 0.2 MG/DL (ref 0.2–1.1)
BUN SERPL-MCNC: 16 MG/DL (ref 6–23)
CALCIUM SERPL-MCNC: 9.3 MG/DL (ref 8.3–10.4)
CHLORIDE SERPL-SCNC: 105 MMOL/L (ref 103–113)
CO2 SERPL-SCNC: 29 MMOL/L (ref 21–32)
CREAT SERPL-MCNC: 0.9 MG/DL (ref 0.6–1)
GLOBULIN SER CALC-MCNC: 3.6 G/DL (ref 2.8–4.5)
GLUCOSE SERPL-MCNC: 140 MG/DL (ref 65–100)
POTASSIUM SERPL-SCNC: 3.5 MMOL/L (ref 3.5–5.1)
PROT SERPL-MCNC: 7 G/DL (ref 6.3–8.2)
SODIUM SERPL-SCNC: 138 MMOL/L (ref 136–146)

## 2024-04-17 PROCEDURE — G8427 DOCREV CUR MEDS BY ELIG CLIN: HCPCS | Performed by: NURSE PRACTITIONER

## 2024-04-17 PROCEDURE — G8417 CALC BMI ABV UP PARAM F/U: HCPCS | Performed by: NURSE PRACTITIONER

## 2024-04-17 PROCEDURE — 3074F SYST BP LT 130 MM HG: CPT | Performed by: NURSE PRACTITIONER

## 2024-04-17 PROCEDURE — 99213 OFFICE O/P EST LOW 20 MIN: CPT | Performed by: NURSE PRACTITIONER

## 2024-04-17 PROCEDURE — 4004F PT TOBACCO SCREEN RCVD TLK: CPT | Performed by: NURSE PRACTITIONER

## 2024-04-17 PROCEDURE — 3078F DIAST BP <80 MM HG: CPT | Performed by: NURSE PRACTITIONER

## 2024-04-17 RX ORDER — LOSARTAN POTASSIUM 25 MG/1
25 TABLET ORAL DAILY
Qty: 90 TABLET | Refills: 1 | Status: CANCELLED | OUTPATIENT
Start: 2024-04-17

## 2024-04-17 RX ORDER — CLINDAMYCIN PHOSPHATE 10 MG/G
GEL TOPICAL
Qty: 30 G | Refills: 0 | Status: SHIPPED | OUTPATIENT
Start: 2024-04-17 | End: 2024-04-24

## 2024-04-17 ASSESSMENT — ENCOUNTER SYMPTOMS
SHORTNESS OF BREATH: 0
FACIAL SWELLING: 0

## 2024-04-17 NOTE — PROGRESS NOTES
[facility]  [address] [place] [address]  Phone [phone]  Fax:  [phone]    Patient: Fauzia Mendieta  YOB: 1980  Patient Age 44 y.o.  Patient sex: female  Medical Record:  [id number]  Visit Date: 04/17/24    [facility] Clinic Note     Chief Complaint   Patient presents with    Rash     Pt states for the past ~2 weeks she has noticed bumps that are itching on her neck that is spreading to her face. Does have PMH of eczema. Pt states her appetite has also greatly increased as well         History of Present Illness:  Ms. Mendieta is a pleasant 44 year old female with a past medical history as noted below who presents for an acute visit.  Patient states approx 2 weeks ago she began to experience a rash with white bumps to her neck and chin area.  Patient states she has abnormal growth of hair in that area and has been shaving.  States that she has had abnormal hair growth to her chin area for a number of years, has had waxing before but has been shaving recently.  Denies any other associated symptoms, no fever/chills, no new products, has not been around anyone with similar symptoms, no facial edema, no difficulty breathing.  States she has regular periods, has seen an increase in duration of periods, is not on birth control.          Allergies:  Allergies   Allergen Reactions    Lisinopril Anaphylaxis    Other Itching     Pt states she is allergic to makeup. Pt states she is also allergic to pet dander.        Current Medications:   Medications marked \"taking\" at this time:    Current Outpatient Medications:     clindamycin (CLEOCIN-T) 1 % gel, Apply topically 2 times daily x 1 week., Disp: 30 g, Rfl: 0    omeprazole (PRILOSEC) 20 MG delayed release capsule, Take 1 capsule by mouth daily, Disp: 90 capsule, Rfl: 1    losartan (COZAAR) 25 MG tablet, Take 1 tablet by mouth daily, Disp: 90 tablet, Rfl: 1    atorvastatin (LIPITOR) 80 MG tablet, Take 1 tablet by mouth

## 2024-04-19 ENCOUNTER — TELEPHONE (OUTPATIENT)
Dept: FAMILY MEDICINE CLINIC | Facility: CLINIC | Age: 44
End: 2024-04-19

## 2024-04-19 NOTE — TELEPHONE ENCOUNTER
Attempted call to pt. No VM setup. Will send [employer] msg with self scheduling for additional labs.

## 2024-04-22 ENCOUNTER — NURSE ONLY (OUTPATIENT)
Dept: FAMILY MEDICINE CLINIC | Facility: CLINIC | Age: 44
End: 2024-04-22

## 2024-04-22 DIAGNOSIS — L67.8 ABNORMAL FACIAL HAIR: ICD-10-CM

## 2024-04-23 ENCOUNTER — TELEPHONE (OUTPATIENT)
Age: 44
End: 2024-04-23

## 2024-04-23 NOTE — TELEPHONE ENCOUNTER
Received message form answering service patient called  needing information on Prep. Sarah our surgery scheduler  sent instructions through my chart for patient

## 2024-04-24 ENCOUNTER — TELEPHONE (OUTPATIENT)
Dept: GASTROENTEROLOGY | Age: 44
End: 2024-04-24

## 2024-04-25 LAB — TESTOST SERPL-MCNC: 28.9 NG/DL

## 2024-05-11 PROBLEM — Z12.11 ENCOUNTER FOR SCREENING COLONOSCOPY: Status: RESOLVED | Noted: 2024-04-10 | Resolved: 2024-05-11

## 2024-06-14 ENCOUNTER — TELEPHONE (OUTPATIENT)
Dept: NEUROLOGY | Age: 44
End: 2024-06-14

## 2024-10-25 ENCOUNTER — HOSPITAL ENCOUNTER (EMERGENCY)
Age: 44
Discharge: HOME | End: 2024-10-25
Payer: MEDICARE

## 2024-10-25 VITALS
BODY MASS INDEX: 35.51 KG/M2 | DIASTOLIC BLOOD PRESSURE: 78 MMHG | OXYGEN SATURATION: 98 % | WEIGHT: 208 LBS | RESPIRATION RATE: 16 BRPM | HEIGHT: 64 IN | SYSTOLIC BLOOD PRESSURE: 112 MMHG | TEMPERATURE: 98.1 F | HEART RATE: 85 BPM

## 2024-10-25 DIAGNOSIS — J45.901 MILD ASTHMA EXACERBATION: Primary | ICD-10-CM

## 2024-10-25 DIAGNOSIS — E11.40 TYPE 2 DIABETES MELLITUS WITH DIABETIC NEUROPATHY, WITHOUT LONG-TERM CURRENT USE OF INSULIN (HCC): ICD-10-CM

## 2024-10-25 DIAGNOSIS — Z76.0 ENCOUNTER FOR MEDICATION REFILL: ICD-10-CM

## 2024-10-25 LAB
GLUCOSE BLD STRIP.AUTO-MCNC: 157 MG/DL (ref 65–100)
SERVICE CMNT-IMP: ABNORMAL

## 2024-10-25 PROCEDURE — 94760 N-INVAS EAR/PLS OXIMETRY 1: CPT

## 2024-10-25 PROCEDURE — 82962 GLUCOSE BLOOD TEST: CPT

## 2024-10-25 PROCEDURE — 6370000000 HC RX 637 (ALT 250 FOR IP)

## 2024-10-25 PROCEDURE — 99283 EMERGENCY DEPT VISIT LOW MDM: CPT

## 2024-10-25 PROCEDURE — 94640 AIRWAY INHALATION TREATMENT: CPT

## 2024-10-25 RX ORDER — METHYLPREDNISOLONE 4 MG/1
TABLET ORAL
Qty: 1 KIT | Refills: 0 | Status: SHIPPED | OUTPATIENT
Start: 2024-10-25 | End: 2024-10-25

## 2024-10-25 RX ORDER — ALBUTEROL SULFATE 90 UG/1
2 INHALANT RESPIRATORY (INHALATION) EVERY 6 HOURS PRN
Qty: 18 G | Refills: 0 | Status: SHIPPED | OUTPATIENT
Start: 2024-10-25

## 2024-10-25 RX ORDER — METHYLPREDNISOLONE 4 MG/1
TABLET ORAL
Qty: 1 KIT | Refills: 0 | Status: SHIPPED | OUTPATIENT
Start: 2024-10-25 | End: 2024-10-31

## 2024-10-25 RX ORDER — IPRATROPIUM BROMIDE AND ALBUTEROL SULFATE 2.5; .5 MG/3ML; MG/3ML
1 SOLUTION RESPIRATORY (INHALATION)
Status: COMPLETED | OUTPATIENT
Start: 2024-10-25 | End: 2024-10-25

## 2024-10-25 RX ORDER — ALBUTEROL SULFATE 90 UG/1
2 INHALANT RESPIRATORY (INHALATION) EVERY 6 HOURS PRN
Qty: 18 G | Refills: 0 | Status: SHIPPED | OUTPATIENT
Start: 2024-10-25 | End: 2024-10-25

## 2024-10-25 RX ADMIN — IPRATROPIUM BROMIDE AND ALBUTEROL SULFATE 1 DOSE: 2.5; .5 SOLUTION RESPIRATORY (INHALATION) at 14:02

## 2024-10-25 ASSESSMENT — PAIN - FUNCTIONAL ASSESSMENT: PAIN_FUNCTIONAL_ASSESSMENT: NONE - DENIES PAIN
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
## 2024-10-25 NOTE — DISCHARGE INSTRUCTIONS
Medications sent to pharmacy. Follow-up with primary care as scheduled. Closely monitor your blood glucose as the steroids will cause this to be temporarily elevated.    Please return with any new or worsening symptoms.

## 2024-10-25 NOTE — ED NOTES
Patient mobility status  with no difficulty. Provider aware     I have reviewed discharge instructions with the patient.  The patient verbalized understanding.    Patient left ED via Discharge Method: ambulatory to Home with Significant Other.    Opportunity for questions and clarification provided.     Patient given 3 scripts.            Herminia Singh RN  10/25/24 [id number]

## 2024-10-25 NOTE — ED TRIAGE NOTES
Patient reports of \"asthma problems\" patient states, \" she normally feels this way when weather turns cold - with exertion,.\" patient reports of needing metformin until her doctor's appointment on October 30th.     Patient needing a breathing treatment here. Last breathing treatment 7 months ago. Patient reports of also needing an inhaler.

## 2025-01-26 ENCOUNTER — HOSPITAL ENCOUNTER (EMERGENCY)
Age: 45
Discharge: HOME | End: 2025-01-26
Payer: MEDICARE

## 2025-01-26 VITALS
WEIGHT: 205 LBS | SYSTOLIC BLOOD PRESSURE: 117 MMHG | RESPIRATION RATE: 20 BRPM | HEART RATE: 115 BPM | DIASTOLIC BLOOD PRESSURE: 70 MMHG | HEIGHT: 64 IN | BODY MASS INDEX: 35 KG/M2 | TEMPERATURE: 102 F | OXYGEN SATURATION: 97 %

## 2025-01-26 DIAGNOSIS — U07.1 COVID-19: Primary | ICD-10-CM

## 2025-01-26 LAB
FLUAV RNA SPEC QL NAA+PROBE: NOT DETECTED
FLUBV RNA SPEC QL NAA+PROBE: NOT DETECTED
SARS-COV-2 RDRP RESP QL NAA+PROBE: DETECTED
SOURCE: ABNORMAL

## 2025-01-26 PROCEDURE — 6370000000 HC RX 637 (ALT 250 FOR IP)

## 2025-01-26 PROCEDURE — 99283 EMERGENCY DEPT VISIT LOW MDM: CPT

## 2025-01-26 PROCEDURE — 87502 INFLUENZA DNA AMP PROBE: CPT

## 2025-01-26 PROCEDURE — 87635 SARS-COV-2 COVID-19 AMP PRB: CPT

## 2025-01-26 RX ORDER — GUAIFENESIN 600 MG/1
1200 TABLET, EXTENDED RELEASE ORAL 2 TIMES DAILY
Qty: 28 TABLET | Refills: 0 | Status: SHIPPED | OUTPATIENT
Start: 2025-01-26 | End: 2025-02-02

## 2025-01-26 RX ORDER — ACETAMINOPHEN 500 MG
1000 TABLET ORAL
Status: COMPLETED | OUTPATIENT
Start: 2025-01-26 | End: 2025-01-26

## 2025-01-26 RX ADMIN — ACETAMINOPHEN 1000 MG: 500 TABLET, FILM COATED ORAL at 16:01

## 2025-01-26 ASSESSMENT — PAIN DESCRIPTION - LOCATION: LOCATION: GENERALIZED

## 2025-01-26 ASSESSMENT — PAIN SCALES - GENERAL: PAINLEVEL_OUTOF10: 10

## 2025-01-26 ASSESSMENT — PAIN DESCRIPTION - DESCRIPTORS: DESCRIPTORS: ACHING;DULL

## 2025-01-26 ASSESSMENT — PAIN - FUNCTIONAL ASSESSMENT: PAIN_FUNCTIONAL_ASSESSMENT: 0-10

## 2025-01-26 NOTE — ED TRIAGE NOTES
Patient arrived with a complaint of flu like symptoms. Patient reports of facial pain, headaches, body aches, congestion, cough- yellow mucous thick- runny nose, nasal congestion, fever and chills.

## 2025-01-26 NOTE — DISCHARGE INSTRUCTIONS
As we discussed, your workup showed that you do have COVID-19.  Treatment of this is symptomatic.  I will start you also Mucinex for cough and congestion.  Tylenol and ibuprofen every 6-8 hours for aches, pains, fevers.  Please hydrate well over the next few days.  I have placed information in your discharge paperwork in regards to home care in regards to COVID-19.  As we discussed I do want you to follow-up with your primary doctor.  If you do not have 1 I placed information down below.  As we discussed, please return to the emergency department for any new, worsening, concerning symptoms.    We would love to help you get a primary care doctor for follow-up after your emergency department visit.    Please call [phone] between 7AM - 6PM Monday to Friday.  A care navigator will be able to assist you with setting up a doctor close to your home.

## 2025-01-26 NOTE — ED PROVIDER NOTES
Emergency Department Provider Note       PCP: None, None   Age: 44 y.o.   Sex: female     DISPOSITION Decision To Discharge 01/26/2025 04:27:59 PM    ICD-10-CM    1. COVID-19  U07.1           Medical Decision Making     In summary, is a well-appearing nontoxic 44-year-old female coming in the emergency department for URI symptoms.  Patient's COVID test is positive here.  Lung sounds are clear so do not feel x-ray is needed.  No adventitious breath sounds noted.  Patient was tachycardic with fever here.  Tylenol was given.  Instructed to continue Tylenol and ibuprofen at home for aches, pains, fevers.  Will give some Mucinex for cough congestion.  Have given instruction home care in regards to COVID-19.  Will have patient follow-up with primary care.  I have given information if she does not have 1 established.  Aggressive hydration over the next few days.  Very strict return precautions were discussed in which she verbalized understanding.     1 acute, uncomplicated illness or injury.  Prescription drug management performed.  Patient was discharged risks and benefits of hospitalization were considered.  Shared medical decision making was utilized in creating the patients health plan today.  I independently ordered and reviewed each unique test.    I reviewed external records: ED visit note from a different ED.   I reviewed external records: provider visit note from PCP.  I reviewed external records: provider visit note from outside specialist.    history provided by patient.  Patient alert and orient x 4.    I interpreted the swabs.              History     This is a well-appearing nontoxic 44-year-old female coming in for complaints of productive cough with green sputum, congestion, rhinorrhea, body aches, fevers been ongoing for the past couple days.  Does report sick contacts.  No shortness of breath or chest pain.  No abdominal or back pain.  No urinary or bowel symptoms reported.  No nausea or

## 2025-02-11 ENCOUNTER — HOSPITAL ENCOUNTER (EMERGENCY)
Age: 45
Discharge: HOME | End: 2025-02-11
Attending: STUDENT IN AN ORGANIZED HEALTH CARE EDUCATION/TRAINING PROGRAM
Payer: MEDICARE

## 2025-02-11 ENCOUNTER — APPOINTMENT (OUTPATIENT)
Dept: GENERAL RADIOLOGY | Age: 45
End: 2025-02-11
Payer: MEDICARE

## 2025-02-11 VITALS
OXYGEN SATURATION: 99 % | BODY MASS INDEX: 34.83 KG/M2 | RESPIRATION RATE: 13 BRPM | HEIGHT: 64 IN | TEMPERATURE: 98.1 F | DIASTOLIC BLOOD PRESSURE: 100 MMHG | WEIGHT: 204 LBS | HEART RATE: 101 BPM | SYSTOLIC BLOOD PRESSURE: 127 MMHG

## 2025-02-11 DIAGNOSIS — R10.13 EPIGASTRIC PAIN: ICD-10-CM

## 2025-02-11 DIAGNOSIS — R07.9 CHEST PAIN, UNSPECIFIED TYPE: Primary | ICD-10-CM

## 2025-02-11 DIAGNOSIS — R10.13 DYSPEPSIA: ICD-10-CM

## 2025-02-11 DIAGNOSIS — K21.9 GASTROESOPHAGEAL REFLUX DISEASE, UNSPECIFIED WHETHER ESOPHAGITIS PRESENT: ICD-10-CM

## 2025-02-11 LAB
ALBUMIN SERPL-MCNC: 3.6 G/DL (ref 3.5–5)
ALBUMIN/GLOB SERPL: 1 (ref 1–1.9)
ALP SERPL-CCNC: 60 U/L (ref 35–104)
ALT SERPL-CCNC: 30 U/L (ref 8–45)
ANION GAP SERPL CALC-SCNC: 12 MMOL/L (ref 7–16)
AST SERPL-CCNC: 24 U/L (ref 15–37)
BASOPHILS # BLD: 0.05 K/UL (ref 0–0.2)
BASOPHILS NFR BLD: 0.5 % (ref 0–2)
BILIRUB SERPL-MCNC: 0.2 MG/DL (ref 0–1.2)
BILIRUB UR QL: NEGATIVE
BUN SERPL-MCNC: 15 MG/DL (ref 6–23)
CALCIUM SERPL-MCNC: 9.3 MG/DL (ref 8.8–10.2)
CHLORIDE SERPL-SCNC: 101 MMOL/L (ref 98–107)
CO2 SERPL-SCNC: 26 MMOL/L (ref 20–29)
CREAT SERPL-MCNC: 0.79 MG/DL (ref 0.6–1.1)
D DIMER PPP FEU-MCNC: 0.29 UG/ML(FEU)
DIFFERENTIAL METHOD BLD: ABNORMAL
EKG ATRIAL RATE: 102 BPM
EKG DIAGNOSIS: NORMAL
EKG P AXIS: 67 DEGREES
EKG P-R INTERVAL: 156 MS
EKG Q-T INTERVAL: 364 MS
EKG QRS DURATION: 86 MS
EKG QTC CALCULATION (BAZETT): 474 MS
EKG R AXIS: 38 DEGREES
EKG T AXIS: 46 DEGREES
EKG VENTRICULAR RATE: 102 BPM
EOSINOPHIL # BLD: 0.05 K/UL (ref 0–0.8)
EOSINOPHIL NFR BLD: 0.5 % (ref 0.5–7.8)
ERYTHROCYTE [DISTWIDTH] IN BLOOD BY AUTOMATED COUNT: 15.7 % (ref 11.9–14.6)
GLOBULIN SER CALC-MCNC: 3.7 G/DL (ref 2.3–3.5)
GLUCOSE SERPL-MCNC: 137 MG/DL (ref 70–99)
GLUCOSE UR QL STRIP.AUTO: NEGATIVE MG/DL
HCT VFR BLD AUTO: 40.3 % (ref 35.8–46.3)
HGB BLD-MCNC: 13.1 G/DL (ref 11.7–15.4)
IMM GRANULOCYTES # BLD AUTO: 0.04 K/UL (ref 0–0.5)
IMM GRANULOCYTES NFR BLD AUTO: 0.4 % (ref 0–5)
KETONES UR-MCNC: NEGATIVE MG/DL
LEUKOCYTE ESTERASE UR QL STRIP: ABNORMAL
LIPASE SERPL-CCNC: 57 U/L (ref 13–60)
LYMPHOCYTES # BLD: 3.07 K/UL (ref 0.5–4.6)
LYMPHOCYTES NFR BLD: 29.5 % (ref 13–44)
MAGNESIUM SERPL-MCNC: 1.9 MG/DL (ref 1.8–2.4)
MCH RBC QN AUTO: 26.9 PG (ref 26.1–32.9)
MCHC RBC AUTO-ENTMCNC: 32.5 G/DL (ref 31.4–35)
MCV RBC AUTO: 82.8 FL (ref 82–102)
MONOCYTES # BLD: 0.38 K/UL (ref 0.1–1.3)
MONOCYTES NFR BLD: 3.7 % (ref 4–12)
NEUTS SEG # BLD: 6.82 K/UL (ref 1.7–8.2)
NEUTS SEG NFR BLD: 65.4 % (ref 43–78)
NITRITE UR QL: POSITIVE
NRBC # BLD: 0 K/UL (ref 0–0.2)
PH UR: 5.5 (ref 5–9)
PLATELET # BLD AUTO: 346 K/UL (ref 150–450)
PMV BLD AUTO: 9.7 FL (ref 9.4–12.3)
POTASSIUM SERPL-SCNC: 3.8 MMOL/L (ref 3.5–5.1)
PROT SERPL-MCNC: 7.3 G/DL (ref 6.3–8.2)
PROT UR QL: NEGATIVE MG/DL
RBC # BLD AUTO: 4.87 M/UL (ref 4.05–5.2)
RBC # UR STRIP: ABNORMAL
SERVICE CMNT-IMP: ABNORMAL
SODIUM SERPL-SCNC: 139 MMOL/L (ref 136–145)
SP GR UR: >1.03 (ref 1–1.02)
TROPONIN T SERPL HS-MCNC: 12 NG/L (ref 0–14)
TROPONIN T SERPL HS-MCNC: 12 NG/L (ref 0–14)
UROBILINOGEN UR QL: 0.2 EU/DL (ref 0.2–1)
WBC # BLD AUTO: 10.4 K/UL (ref 4.3–11.1)

## 2025-02-11 PROCEDURE — 85025 COMPLETE CBC W/AUTO DIFF WBC: CPT

## 2025-02-11 PROCEDURE — 81003 URINALYSIS AUTO W/O SCOPE: CPT

## 2025-02-11 PROCEDURE — 84484 ASSAY OF TROPONIN QUANT: CPT

## 2025-02-11 PROCEDURE — 80053 COMPREHEN METABOLIC PANEL: CPT

## 2025-02-11 PROCEDURE — 93010 ELECTROCARDIOGRAM REPORT: CPT | Performed by: INTERNAL MEDICINE

## 2025-02-11 PROCEDURE — 71046 X-RAY EXAM CHEST 2 VIEWS: CPT

## 2025-02-11 PROCEDURE — 99285 EMERGENCY DEPT VISIT HI MDM: CPT

## 2025-02-11 PROCEDURE — 93005 ELECTROCARDIOGRAM TRACING: CPT | Performed by: STUDENT IN AN ORGANIZED HEALTH CARE EDUCATION/TRAINING PROGRAM

## 2025-02-11 PROCEDURE — 83735 ASSAY OF MAGNESIUM: CPT

## 2025-02-11 PROCEDURE — 83690 ASSAY OF LIPASE: CPT

## 2025-02-11 PROCEDURE — 85379 FIBRIN DEGRADATION QUANT: CPT

## 2025-02-11 RX ORDER — SUCRALFATE 1 G/1
1 TABLET ORAL 4 TIMES DAILY
Qty: 120 TABLET | Refills: 3 | Status: SHIPPED | OUTPATIENT
Start: 2025-02-11

## 2025-02-11 ASSESSMENT — LIFESTYLE VARIABLES
HOW MANY STANDARD DRINKS CONTAINING ALCOHOL DO YOU HAVE ON A TYPICAL DAY: PATIENT DOES NOT DRINK
HOW OFTEN DO YOU HAVE A DRINK CONTAINING ALCOHOL: NEVER

## 2025-02-11 ASSESSMENT — PAIN SCALES - GENERAL: PAINLEVEL_OUTOF10: 6

## 2025-02-11 ASSESSMENT — PAIN - FUNCTIONAL ASSESSMENT: PAIN_FUNCTIONAL_ASSESSMENT: 0-10

## 2025-02-11 NOTE — ED PROVIDER NOTES
Emergency Department Provider Note       PCP: None, None   Age: 44 y.o.   Sex: female     DISPOSITION Decision To Discharge 02/11/2025 09:40:48 PM    ICD-10-CM    1. Chest pain, unspecified type  R07.9       2. Dyspepsia  R10.13       3. Epigastric pain  R10.13 omeprazole (PRILOSEC) 20 MG delayed release capsule      4. Gastroesophageal reflux disease, unspecified whether esophagitis present  K21.9 omeprazole (PRILOSEC) 20 MG delayed release capsule          Medical Decision Making     Somewhat anxious appearing 44-year-old female presenting this department with reports of chest pain, some mild shortness of breath that started on Saturday while shopping at a local grocery store.  Symptoms have been somewhat intermittent nature since onset.  Patient reports vague history of potentially needing a stent in the past.  She does report several risk factors for heart disease  Orders placed for labs, EKG and chest x-ray.  Patient tells me she was evaluated by her primary care provider who performed an EKG several times in the office today 1 of these tracings was reportedly abnormal.  Patient is unsure of exactly the abnormality noted.  EKG here is reassuring, workup thus far is stable.  Patient does report history of reflux, feels better after belching in this department.  Will cover with reflux meds and refer back to primary care for follow-up.  Patient agreeable with this plan    ED Course as of 02/11/25 2313 Tue Feb 11, 2025   1651 EKG interpretation: Sinus tachycardia, occasional PACs present, normal is axis, no obvious ischemic change [BR]   1757 D-dimer within normal limits, troponin reassuring as well.  Remainder of labs are stable.  Will plan to repeat Trop, chest x-ray clear [BR]   2100 Delay in patient's troponin result secondary to laboratory error.  I have notified the lab, verified that the orders are correct in the computer.  Her lab will run this now. [BR]   2114 Patient states she feels much better at  Auto Differential   Result Value Ref Range    WBC 10.4 4.3 - 11.1 K/uL    RBC 4.87 4.05 - 5.2 M/uL    Hemoglobin 13.1 11.7 - 15.4 g/dL    Hematocrit 40.3 35.8 - 46.3 %    MCV 82.8 82 - 102 FL    MCH 26.9 26.1 - 32.9 PG    MCHC 32.5 31.4 - 35.0 g/dL    RDW 15.7 (H) 11.9 - 14.6 %    Platelets 346 150 - 450 K/uL    MPV 9.7 9.4 - 12.3 FL    nRBC 0.00 0.0 - 0.2 K/uL    Differential Type AUTOMATED      Neutrophils % 65.4 43.0 - 78.0 %    Lymphocytes % 29.5 13.0 - 44.0 %    Monocytes % 3.7 (L) 4.0 - 12.0 %    Eosinophils % 0.5 0.5 - 7.8 %    Basophils % 0.5 0.0 - 2.0 %    Immature Granulocytes % 0.4 0.0 - 5.0 %    Neutrophils Absolute 6.82 1.70 - 8.20 K/UL    Lymphocytes Absolute 3.07 0.50 - 4.60 K/UL    Monocytes Absolute 0.38 0.10 - 1.30 K/UL    Eosinophils Absolute 0.05 0.00 - 0.80 K/UL    Basophils Absolute 0.05 0.00 - 0.20 K/UL    Immature Granulocytes Absolute 0.04 0.0 - 0.5 K/UL   Comprehensive Metabolic Panel   Result Value Ref Range    Sodium 139 136 - 145 mmol/L    Potassium 3.8 3.5 - 5.1 mmol/L    Chloride 101 98 - 107 mmol/L    CO2 26 20 - 29 mmol/L    Anion Gap 12 7 - 16 mmol/L    Glucose 137 (H) 70 - 99 mg/dL    BUN 15 6 - 23 MG/DL    Creatinine 0.79 0.60 - 1.10 MG/DL    Est, Glom Filt Rate >90 >60 ml/min/1.73m2    Calcium 9.3 8.8 - 10.2 MG/DL    Total Bilirubin 0.2 0.0 - 1.2 MG/DL    ALT 30 8 - 45 U/L    AST 24 15 - 37 U/L    Alk Phosphatase 60 35 - 104 U/L    Total Protein 7.3 6.3 - 8.2 g/dL    Albumin 3.6 3.5 - 5.0 g/dL    Globulin 3.7 (H) 2.3 - 3.5 g/dL    Albumin/Globulin Ratio 1.0 1.0 - 1.9     Lipase   Result Value Ref Range    Lipase 57 13 - 60 U/L   Magnesium   Result Value Ref Range    Magnesium 1.9 1.8 - 2.4 mg/dL   D-Dimer, Quantitative   Result Value Ref Range    D-Dimer, Quant 0.29 <0.56 ug/ml(FEU)   Troponin   Result Value Ref Range    Troponin T 12.0 0 - 14 ng/L   Troponin   Result Value Ref Range    Troponin T 12.0 0 - 14 ng/L   POCT Urinalysis no Micro   Result Value Ref Range    Specific

## 2025-02-11 NOTE — ED TRIAGE NOTES
Pt c/o chest pain since Saturday and abnormal EKG today at PCP office. Pt unable to recall what EKG showed. Pmhx NSTEMI in 2021.

## 2025-02-12 NOTE — DISCHARGE INSTRUCTIONS
Your lab work, EKG and chest x-ray appear stable tonight focus on plenty of clear liquids to ensure hydration, follow a bland diet until symptoms resolve.  Take the medication prescribed as directed.  Arrange follow-up with your primary care provider for recheck as discussed